# Patient Record
Sex: FEMALE | Race: WHITE | Employment: FULL TIME | URBAN - METROPOLITAN AREA
[De-identification: names, ages, dates, MRNs, and addresses within clinical notes are randomized per-mention and may not be internally consistent; named-entity substitution may affect disease eponyms.]

---

## 2023-02-18 PROBLEM — R05.9 COUGH: Status: ACTIVE | Noted: 2023-02-18

## 2023-02-18 PROBLEM — M19.071 PRIMARY OSTEOARTHRITIS OF BOTH FEET: Status: ACTIVE | Noted: 2023-02-18

## 2023-02-18 PROBLEM — R10.31 INGUINAL PAIN OF BOTH SIDES: Status: ACTIVE | Noted: 2023-02-18

## 2023-02-18 PROBLEM — R39.11 URINARY HESITANCY: Status: ACTIVE | Noted: 2023-02-18

## 2023-02-18 PROBLEM — R82.90 MALODOROUS URINE: Status: ACTIVE | Noted: 2023-02-18

## 2023-02-18 PROBLEM — H10.45 CONJUNCTIVITIS, ALLERGIC, CHRONIC: Status: ACTIVE | Noted: 2023-02-18

## 2023-02-18 PROBLEM — I10 BENIGN ESSENTIAL HYPERTENSION: Status: ACTIVE | Noted: 2023-02-18

## 2023-02-18 PROBLEM — M62.89 PELVIC FLOOR DYSFUNCTION: Status: ACTIVE | Noted: 2023-02-18

## 2023-02-18 PROBLEM — M19.072 PRIMARY OSTEOARTHRITIS OF BOTH FEET: Status: ACTIVE | Noted: 2023-02-18

## 2023-02-18 PROBLEM — M62.59 ATROPHY OF MUSCLE OF MULTIPLE SITES: Status: ACTIVE | Noted: 2023-02-18

## 2023-02-18 PROBLEM — M25.511 CHRONIC PERISCAPULAR PAIN ON RIGHT SIDE: Status: ACTIVE | Noted: 2023-02-18

## 2023-02-18 PROBLEM — R10.2 PELVIC PAIN: Status: ACTIVE | Noted: 2023-02-18

## 2023-02-18 PROBLEM — R59.0 LYMPHADENOPATHY, INGUINAL: Status: ACTIVE | Noted: 2023-02-18

## 2023-02-18 PROBLEM — R10.32 INGUINAL PAIN OF BOTH SIDES: Status: ACTIVE | Noted: 2023-02-18

## 2023-02-18 PROBLEM — M79.18 MYOFASCIAL PAIN: Status: ACTIVE | Noted: 2023-02-18

## 2023-02-18 PROBLEM — F64.9 GENDER DYSPHORIA: Status: ACTIVE | Noted: 2023-02-18

## 2023-02-18 PROBLEM — F41.9 ACUTE ANXIETY: Status: ACTIVE | Noted: 2023-02-18

## 2023-02-18 PROBLEM — M77.8 HAND TENDONITIS: Status: ACTIVE | Noted: 2023-02-18

## 2023-02-18 PROBLEM — G89.29 CHRONIC PERISCAPULAR PAIN ON RIGHT SIDE: Status: ACTIVE | Noted: 2023-02-18

## 2023-02-18 PROBLEM — K58.9 IRRITABLE BOWEL SYNDROME (IBS): Status: ACTIVE | Noted: 2023-02-18

## 2023-02-18 PROBLEM — M20.22 HALLUX RIGIDUS OF LEFT FOOT: Status: ACTIVE | Noted: 2023-02-18

## 2023-02-18 PROBLEM — J30.9 ALLERGIC RHINITIS: Status: ACTIVE | Noted: 2023-02-18

## 2023-02-18 PROBLEM — M20.21 HALLUX RIGIDUS OF RIGHT FOOT: Status: ACTIVE | Noted: 2023-02-18

## 2023-02-18 PROBLEM — M47.812 CERVICAL SPONDYLOSIS WITHOUT MYELOPATHY: Status: ACTIVE | Noted: 2023-02-18

## 2023-02-18 RX ORDER — CALCIUM CARBONATE 300MG(750)
1 TABLET,CHEWABLE ORAL
COMMUNITY

## 2023-02-18 RX ORDER — LIDOCAINE AND PRILOCAINE 25; 25 MG/G; MG/G
CREAM TOPICAL AS NEEDED
COMMUNITY
End: 2023-08-15 | Stop reason: ALTCHOICE

## 2023-02-18 RX ORDER — GABAPENTIN 100 MG/1
1 CAPSULE ORAL 2 TIMES DAILY
COMMUNITY
End: 2023-08-15 | Stop reason: ALTCHOICE

## 2023-02-18 RX ORDER — TIMOLOL MALEATE 5 MG/ML
1 SOLUTION/ DROPS OPHTHALMIC NIGHTLY
COMMUNITY
Start: 2021-05-24

## 2023-02-18 RX ORDER — ASCORBIC ACID 300 MG
1 TABLET,CHEWABLE ORAL
COMMUNITY
End: 2023-03-07 | Stop reason: ALTCHOICE

## 2023-02-18 RX ORDER — IBUPROFEN 100 MG/5ML
1 SUSPENSION, ORAL (FINAL DOSE FORM) ORAL DAILY
COMMUNITY

## 2023-02-18 RX ORDER — LOTEPREDNOL ETABONATE 5 MG/ML
1 SUSPENSION/ DROPS OPHTHALMIC
COMMUNITY
End: 2023-08-15 | Stop reason: ALTCHOICE

## 2023-02-18 RX ORDER — ESTRADIOL 0.1 MG/G
2 CREAM VAGINAL 3 TIMES WEEKLY
COMMUNITY

## 2023-02-18 RX ORDER — CYCLOBENZAPRINE HCL 10 MG
1 TABLET ORAL 3 TIMES DAILY PRN
COMMUNITY
Start: 2021-09-23

## 2023-02-18 RX ORDER — ALPRAZOLAM 0.25 MG/1
1 TABLET ORAL 3 TIMES DAILY PRN
COMMUNITY
Start: 2022-08-29 | End: 2023-08-15 | Stop reason: SDUPTHER

## 2023-02-18 RX ORDER — METHOCARBAMOL 750 MG/1
1 TABLET, FILM COATED ORAL 3 TIMES DAILY
COMMUNITY
Start: 2022-08-16 | End: 2023-08-15 | Stop reason: ALTCHOICE

## 2023-02-18 RX ORDER — MELOXICAM 15 MG/1
1 TABLET ORAL DAILY PRN
COMMUNITY
Start: 2022-07-06 | End: 2023-03-16

## 2023-02-18 RX ORDER — CYANOCOBALAMIN (VITAMIN B-12) 500 MCG
1 TABLET ORAL DAILY PRN
COMMUNITY
End: 2024-05-31 | Stop reason: ALTCHOICE

## 2023-02-18 RX ORDER — CALCIUM CARBONATE 500(1250)
1 TABLET ORAL DAILY
COMMUNITY
End: 2023-03-07 | Stop reason: ALTCHOICE

## 2023-02-18 RX ORDER — POLYETHYLENE GLYCOL 3350 17 G/17G
POWDER, FOR SOLUTION ORAL
COMMUNITY
Start: 2022-09-20 | End: 2023-08-15 | Stop reason: ALTCHOICE

## 2023-02-18 RX ORDER — MULTIVITAMIN
1 TABLET ORAL DAILY
COMMUNITY

## 2023-02-18 RX ORDER — SPIRONOLACTONE 25 MG
1 TABLET ORAL DAILY
COMMUNITY
End: 2024-05-31 | Stop reason: ALTCHOICE

## 2023-02-18 RX ORDER — ALBUTEROL SULFATE 90 UG/1
1-2 AEROSOL, METERED RESPIRATORY (INHALATION)
COMMUNITY
Start: 2022-10-07 | End: 2023-07-06 | Stop reason: SDUPTHER

## 2023-02-18 RX ORDER — S-ADENOSYLMETHIONINE SUL TOSYL 400 MG
TABLET, DELAYED RELEASE (ENTERIC COATED) ORAL
COMMUNITY

## 2023-02-18 RX ORDER — LISINOPRIL 20 MG/1
1 TABLET ORAL DAILY
COMMUNITY
End: 2023-08-15 | Stop reason: SDUPTHER

## 2023-02-18 RX ORDER — ESTRADIOL 0.1 MG/D
2 FILM, EXTENDED RELEASE TRANSDERMAL 2 TIMES WEEKLY
COMMUNITY
End: 2023-10-11 | Stop reason: SDUPTHER

## 2023-02-18 RX ORDER — PROGESTERONE 100 MG/1
100 CAPSULE ORAL NIGHTLY
COMMUNITY
End: 2023-12-21 | Stop reason: SDUPTHER

## 2023-03-07 ENCOUNTER — OFFICE VISIT (OUTPATIENT)
Dept: PRIMARY CARE | Facility: CLINIC | Age: 63
End: 2023-03-07
Payer: COMMERCIAL

## 2023-03-07 VITALS
WEIGHT: 152 LBS | BODY MASS INDEX: 25.33 KG/M2 | DIASTOLIC BLOOD PRESSURE: 73 MMHG | HEIGHT: 65 IN | HEART RATE: 71 BPM | SYSTOLIC BLOOD PRESSURE: 131 MMHG

## 2023-03-07 DIAGNOSIS — M54.12 CERVICAL RADICULOPATHY: Primary | ICD-10-CM

## 2023-03-07 PROCEDURE — 3078F DIAST BP <80 MM HG: CPT | Performed by: INTERNAL MEDICINE

## 2023-03-07 PROCEDURE — 1036F TOBACCO NON-USER: CPT | Performed by: INTERNAL MEDICINE

## 2023-03-07 PROCEDURE — 3075F SYST BP GE 130 - 139MM HG: CPT | Performed by: INTERNAL MEDICINE

## 2023-03-07 PROCEDURE — 99214 OFFICE O/P EST MOD 30 MIN: CPT | Performed by: INTERNAL MEDICINE

## 2023-03-07 RX ORDER — OXYCODONE AND ACETAMINOPHEN 10; 325 MG/1; MG/1
1 TABLET ORAL EVERY 6 HOURS PRN
Qty: 20 TABLET | Refills: 0 | Status: SHIPPED | OUTPATIENT
Start: 2023-03-07 | End: 2023-03-12

## 2023-03-07 NOTE — PROGRESS NOTES
"Subjective   Saurabh Patel is a 63 y.o. female who presents for medication .  [unfilled]  She is going to [place]  She recently had a relapse of arm pain due to her cspine issues.  She goes to chiropractor regularly and  traction at home  Is also taking mobic and tylenol.    She had been given rx for gabapentin but did not take it due to concern over side effects  Has seen  pain mgmt,was offered injections  She has a medrol dosepak that she has not used yet. It is current.  Cyclobenzaprine is her \"go to \" med. Usually takes at night.  Notes xanax works better for pain. Usually takes half of a tab.     She would like an rx for percocet . Says that the 5 mg dose is ineffective.     Review of Systems    Objective   /73   Pulse 71   Ht 1.651 m (5' 5\")   Wt 68.9 kg (152 lb)   BMI 25.29 kg/m²    Physical Exam  Constitutional:       Appearance: Normal appearance.   Cardiovascular:      Rate and Rhythm: Normal rate and regular rhythm.      Heart sounds: Normal heart sounds.   Pulmonary:      Effort: Pulmonary effort is normal.      Breath sounds: Normal breath sounds.   Musculoskeletal:      Cervical back: Normal range of motion.         Assessment/Plan   Cervical radiculopathy: sent RX for five days (#20) percocet 10/325. She said she had enough cyclobenzaprine . Is ok to take mobic as well . She will also take her medrol dosepak with her.   Anxiety: she acknowledges that the xanax helps with her with the anxiety that she experiences when she has pain. She says she usually will just take half a tab of xanax when she uses it. If she needs rx for xanax prior to leaving she will call.   She wanted me to enter orders for her for when she schedules her annual physical.    Problem List Items Addressed This Visit    None         "

## 2023-06-08 ENCOUNTER — TELEPHONE (OUTPATIENT)
Dept: PRIMARY CARE | Facility: CLINIC | Age: 63
End: 2023-06-08

## 2023-06-08 NOTE — TELEPHONE ENCOUNTER
Julius has annual in July. Asking for lab orders, bone density and mammogram orders. Can this be done,needs before ins. Runs out.

## 2023-06-12 DIAGNOSIS — Z12.31 VISIT FOR SCREENING MAMMOGRAM: Primary | ICD-10-CM

## 2023-06-12 DIAGNOSIS — Z13.820 ENCOUNTER FOR SCREENING FOR OSTEOPOROSIS: Primary | ICD-10-CM

## 2023-06-12 DIAGNOSIS — Z51.81 MEDICATION MONITORING ENCOUNTER: ICD-10-CM

## 2023-06-12 DIAGNOSIS — Z51.81 THERAPEUTIC DRUG MONITORING: ICD-10-CM

## 2023-06-12 DIAGNOSIS — Z13.6 SCREENING FOR CARDIOVASCULAR CONDITION: ICD-10-CM

## 2023-06-12 DIAGNOSIS — Z00.00 HEALTHCARE MAINTENANCE: ICD-10-CM

## 2023-06-26 ENCOUNTER — HOSPITAL ENCOUNTER (OUTPATIENT)
Dept: DATA CONVERSION | Facility: HOSPITAL | Age: 63
End: 2023-06-26
Attending: OBSTETRICS & GYNECOLOGY | Admitting: OBSTETRICS & GYNECOLOGY
Payer: COMMERCIAL

## 2023-06-26 DIAGNOSIS — K40.20 BILATERAL INGUINAL HERNIA, WITHOUT OBSTRUCTION OR GANGRENE, NOT SPECIFIED AS RECURRENT: ICD-10-CM

## 2023-06-26 DIAGNOSIS — F64.9 GENDER IDENTITY DISORDER, UNSPECIFIED: ICD-10-CM

## 2023-06-26 DIAGNOSIS — L92.9 GRANULOMATOUS DISORDER OF THE SKIN AND SUBCUTANEOUS TISSUE, UNSPECIFIED: ICD-10-CM

## 2023-06-26 DIAGNOSIS — F41.9 ANXIETY DISORDER, UNSPECIFIED: ICD-10-CM

## 2023-06-26 DIAGNOSIS — M47.812 SPONDYLOSIS WITHOUT MYELOPATHY OR RADICULOPATHY, CERVICAL REGION: ICD-10-CM

## 2023-06-26 DIAGNOSIS — F32.A DEPRESSION, UNSPECIFIED: ICD-10-CM

## 2023-06-26 DIAGNOSIS — N94.9 UNSPECIFIED CONDITION ASSOCIATED WITH FEMALE GENITAL ORGANS AND MENSTRUAL CYCLE: ICD-10-CM

## 2023-06-26 DIAGNOSIS — Z87.891 PERSONAL HISTORY OF NICOTINE DEPENDENCE: ICD-10-CM

## 2023-06-26 DIAGNOSIS — R52 PAIN, UNSPECIFIED: ICD-10-CM

## 2023-06-26 DIAGNOSIS — H54.7 UNSPECIFIED VISUAL LOSS: ICD-10-CM

## 2023-06-26 DIAGNOSIS — I10 ESSENTIAL (PRIMARY) HYPERTENSION: ICD-10-CM

## 2023-06-26 LAB
ANION GAP IN SER/PLAS: 13 MMOL/L (ref 10–20)
CALCIUM (MG/DL) IN SER/PLAS: 9 MG/DL (ref 8.6–10.3)
CARBON DIOXIDE, TOTAL (MMOL/L) IN SER/PLAS: 23 MMOL/L (ref 21–32)
CHLORIDE (MMOL/L) IN SER/PLAS: 105 MMOL/L (ref 98–107)
CREATININE (MG/DL) IN SER/PLAS: 0.95 MG/DL (ref 0.5–1.05)
GFR FEMALE: 68 ML/MIN/1.73M2
GLUCOSE (MG/DL) IN SER/PLAS: 105 MG/DL (ref 74–99)
HEMATOCRIT (%) IN BLOOD BY AUTOMATED COUNT: 40.1 % (ref 36–46)
HEMOGLOBIN (G/DL) IN BLOOD: 13.1 G/DL (ref 12–16)
POTASSIUM (MMOL/L) IN SER/PLAS: 4.4 MMOL/L (ref 3.5–5.3)
SODIUM (MMOL/L) IN SER/PLAS: 137 MMOL/L (ref 136–145)
UREA NITROGEN (MG/DL) IN SER/PLAS: 13 MG/DL (ref 6–23)

## 2023-06-28 LAB
ATRIAL RATE: 59 BPM
P AXIS: 34 DEGREES
P OFFSET: 202 MS
P ONSET: 146 MS
PR INTERVAL: 134 MS
Q ONSET: 213 MS
QRS COUNT: 9 BEATS
QRS DURATION: 88 MS
QT INTERVAL: 442 MS
QTC CALCULATION(BAZETT): 437 MS
QTC FREDERICIA: 439 MS
R AXIS: 16 DEGREES
T AXIS: 15 DEGREES
T OFFSET: 434 MS
VENTRICULAR RATE: 59 BPM

## 2023-07-02 ENCOUNTER — APPOINTMENT (OUTPATIENT)
Dept: PRIMARY CARE | Facility: CLINIC | Age: 63
End: 2023-07-02

## 2023-07-06 ENCOUNTER — TELEPHONE (OUTPATIENT)
Dept: PRIMARY CARE | Facility: CLINIC | Age: 63
End: 2023-07-06

## 2023-07-06 DIAGNOSIS — N39.0 ACUTE UTI: Primary | ICD-10-CM

## 2023-07-06 DIAGNOSIS — J45.20 MILD INTERMITTENT EXTRINSIC ASTHMA WITHOUT COMPLICATION (HHS-HCC): Primary | ICD-10-CM

## 2023-07-06 RX ORDER — ALBUTEROL SULFATE 90 UG/1
1-2 AEROSOL, METERED RESPIRATORY (INHALATION)
Qty: 18 G | Refills: 0 | Status: CANCELLED | OUTPATIENT
Start: 2023-07-06

## 2023-07-06 RX ORDER — ALBUTEROL SULFATE 90 UG/1
AEROSOL, METERED RESPIRATORY (INHALATION)
Qty: 18 G | Refills: 1 | Status: SHIPPED | OUTPATIENT
Start: 2023-07-06

## 2023-07-06 RX ORDER — CIPROFLOXACIN 500 MG/1
500 TABLET ORAL 2 TIMES DAILY
Qty: 10 TABLET | Refills: 0 | Status: SHIPPED | OUTPATIENT
Start: 2023-07-06 | End: 2023-07-11

## 2023-07-06 RX ORDER — PROGESTERONE 100 MG/1
100 CAPSULE ORAL DAILY
Qty: 30 CAPSULE | Refills: 0 | Status: CANCELLED | OUTPATIENT
Start: 2023-07-06

## 2023-07-06 NOTE — TELEPHONE ENCOUNTER
Patient believes she as a uti. Has had about two days. Some burning with urination, frequency, bladder aching, smells off. Was just at building having a bone density. Asking if doctor would call in something. [facility]    [phone]

## 2023-07-06 NOTE — TELEPHONE ENCOUNTER
"I called the pt she is also c/o urgency, foul smelling urine she noticed this am and tender and cramping in the \"pubic/abdominal area\"No fevers and no noticeable blood. Pt is concerned going in the weekend and asked if something can be called in for her.  "

## 2023-07-06 NOTE — TELEPHONE ENCOUNTER
I called the pt and notified her she is also requesting refill for albuterol inhaler and asked if you could order her progesterone for her.

## 2023-07-07 DIAGNOSIS — G89.29 OTHER CHRONIC PAIN: ICD-10-CM

## 2023-07-07 DIAGNOSIS — M25.511 PAIN IN RIGHT SHOULDER: ICD-10-CM

## 2023-07-11 RX ORDER — MELOXICAM 15 MG/1
TABLET ORAL
Qty: 30 TABLET | Refills: 2 | Status: SHIPPED | OUTPATIENT
Start: 2023-07-11 | End: 2023-08-15 | Stop reason: ALTCHOICE

## 2023-07-20 LAB
COMPLETE PATHOLOGY REPORT: NORMAL
CONVERTED CLINICAL DIAGNOSIS-HISTORY: NORMAL
CONVERTED FINAL DIAGNOSIS: NORMAL
CONVERTED FINAL REPORT PDF LINK TO COPY AND PASTE: NORMAL
CONVERTED GROSS DESCRIPTION: NORMAL

## 2023-07-28 ENCOUNTER — APPOINTMENT (OUTPATIENT)
Dept: PRIMARY CARE | Facility: CLINIC | Age: 63
End: 2023-07-28

## 2023-08-04 ENCOUNTER — APPOINTMENT (OUTPATIENT)
Dept: PRIMARY CARE | Facility: CLINIC | Age: 63
End: 2023-08-04
Payer: COMMERCIAL

## 2023-08-04 ENCOUNTER — LAB (OUTPATIENT)
Dept: LAB | Facility: LAB | Age: 63
End: 2023-08-04
Payer: COMMERCIAL

## 2023-08-04 DIAGNOSIS — Z00.00 HEALTHCARE MAINTENANCE: ICD-10-CM

## 2023-08-04 DIAGNOSIS — Z13.6 SCREENING FOR CARDIOVASCULAR CONDITION: ICD-10-CM

## 2023-08-04 DIAGNOSIS — Z51.81 MEDICATION MONITORING ENCOUNTER: ICD-10-CM

## 2023-08-04 LAB
BASOPHILS (10*3/UL) IN BLOOD BY AUTOMATED COUNT: 0.08 X10E9/L (ref 0–0.1)
BASOPHILS/100 LEUKOCYTES IN BLOOD BY AUTOMATED COUNT: 0.9 % (ref 0–2)
EOSINOPHILS (10*3/UL) IN BLOOD BY AUTOMATED COUNT: 0.2 X10E9/L (ref 0–0.7)
EOSINOPHILS/100 LEUKOCYTES IN BLOOD BY AUTOMATED COUNT: 2.4 % (ref 0–6)
ERYTHROCYTE DISTRIBUTION WIDTH (RATIO) BY AUTOMATED COUNT: 12.6 % (ref 11.5–14.5)
ERYTHROCYTE MEAN CORPUSCULAR HEMOGLOBIN CONCENTRATION (G/DL) BY AUTOMATED: 32.4 G/DL (ref 32–36)
ERYTHROCYTE MEAN CORPUSCULAR VOLUME (FL) BY AUTOMATED COUNT: 93 FL (ref 80–100)
ERYTHROCYTES (10*6/UL) IN BLOOD BY AUTOMATED COUNT: 4.31 X10E12/L (ref 4–5.2)
HEMATOCRIT (%) IN BLOOD BY AUTOMATED COUNT: 40.1 % (ref 36–46)
HEMOGLOBIN (G/DL) IN BLOOD: 13 G/DL (ref 12–16)
IMMATURE GRANULOCYTES/100 LEUKOCYTES IN BLOOD BY AUTOMATED COUNT: 0.4 % (ref 0–0.9)
LEUKOCYTES (10*3/UL) IN BLOOD BY AUTOMATED COUNT: 8.5 X10E9/L (ref 4.4–11.3)
LYMPHOCYTES (10*3/UL) IN BLOOD BY AUTOMATED COUNT: 1.54 X10E9/L (ref 1.2–4.8)
LYMPHOCYTES/100 LEUKOCYTES IN BLOOD BY AUTOMATED COUNT: 18.2 % (ref 13–44)
MONOCYTES (10*3/UL) IN BLOOD BY AUTOMATED COUNT: 0.68 X10E9/L (ref 0.1–1)
MONOCYTES/100 LEUKOCYTES IN BLOOD BY AUTOMATED COUNT: 8 % (ref 2–10)
NEUTROPHILS (10*3/UL) IN BLOOD BY AUTOMATED COUNT: 5.92 X10E9/L (ref 1.2–7.7)
NEUTROPHILS/100 LEUKOCYTES IN BLOOD BY AUTOMATED COUNT: 70.1 % (ref 40–80)
NRBC (PER 100 WBCS) BY AUTOMATED COUNT: 0 /100 WBC (ref 0–0)
PLATELETS (10*3/UL) IN BLOOD AUTOMATED COUNT: 363 X10E9/L (ref 150–450)

## 2023-08-04 PROCEDURE — 84443 ASSAY THYROID STIM HORMONE: CPT

## 2023-08-04 PROCEDURE — 85025 COMPLETE CBC W/AUTO DIFF WBC: CPT

## 2023-08-04 PROCEDURE — 80061 LIPID PANEL: CPT

## 2023-08-04 PROCEDURE — 36415 COLL VENOUS BLD VENIPUNCTURE: CPT

## 2023-08-04 PROCEDURE — 80053 COMPREHEN METABOLIC PANEL: CPT

## 2023-08-05 LAB
ALANINE AMINOTRANSFERASE (SGPT) (U/L) IN SER/PLAS: 13 U/L (ref 7–45)
ALBUMIN (G/DL) IN SER/PLAS: 4.4 G/DL (ref 3.4–5)
ALKALINE PHOSPHATASE (U/L) IN SER/PLAS: 46 U/L (ref 33–136)
ANION GAP IN SER/PLAS: 13 MMOL/L (ref 10–20)
ASPARTATE AMINOTRANSFERASE (SGOT) (U/L) IN SER/PLAS: 16 U/L (ref 9–39)
BILIRUBIN TOTAL (MG/DL) IN SER/PLAS: 0.6 MG/DL (ref 0–1.2)
CALCIUM (MG/DL) IN SER/PLAS: 9.2 MG/DL (ref 8.6–10.6)
CARBON DIOXIDE, TOTAL (MMOL/L) IN SER/PLAS: 27 MMOL/L (ref 21–32)
CHLORIDE (MMOL/L) IN SER/PLAS: 103 MMOL/L (ref 98–107)
CHOLESTEROL (MG/DL) IN SER/PLAS: 168 MG/DL (ref 0–199)
CHOLESTEROL IN HDL (MG/DL) IN SER/PLAS: 76.8 MG/DL
CHOLESTEROL/HDL RATIO: 2.2
CREATININE (MG/DL) IN SER/PLAS: 1.08 MG/DL (ref 0.5–1.05)
ESTRADIOL (PG/ML) IN SER/PLAS: 127 PG/ML
GFR FEMALE: 58 ML/MIN/1.73M2
GLUCOSE (MG/DL) IN SER/PLAS: 92 MG/DL (ref 74–99)
LDL: 74 MG/DL (ref 0–99)
POTASSIUM (MMOL/L) IN SER/PLAS: 4.8 MMOL/L (ref 3.5–5.3)
PROTEIN TOTAL: 7.4 G/DL (ref 6.4–8.2)
SODIUM (MMOL/L) IN SER/PLAS: 138 MMOL/L (ref 136–145)
TESTOSTERONE (NG/DL) IN SER/PLAS: <60 NG/DL (ref 0–70)
THYROTROPIN (MIU/L) IN SER/PLAS BY DETECTION LIMIT <= 0.05 MIU/L: 0.81 MIU/L (ref 0.44–3.98)
THYROTROPIN (MIU/L) IN SER/PLAS BY DETECTION LIMIT <= 0.05 MIU/L: 0.83 MIU/L (ref 0.44–3.98)
TRIGLYCERIDE (MG/DL) IN SER/PLAS: 84 MG/DL (ref 0–149)
UREA NITROGEN (MG/DL) IN SER/PLAS: 21 MG/DL (ref 6–23)
VLDL: 17 MG/DL (ref 0–40)

## 2023-08-14 ASSESSMENT — PROMIS GLOBAL HEALTH SCALE
RATE_PHYSICAL_HEALTH: GOOD
RATE_SOCIAL_SATISFACTION: FAIR
RATE_AVERAGE_PAIN: 3
CARRYOUT_SOCIAL_ACTIVITIES: FAIR
CARRYOUT_PHYSICAL_ACTIVITIES: COMPLETELY
RATE_MENTAL_HEALTH: GOOD
RATE_GENERAL_HEALTH: VERY GOOD
RATE_AVERAGE_FATIGUE: MILD
EMOTIONAL_PROBLEMS: SOMETIMES
RATE_QUALITY_OF_LIFE: GOOD

## 2023-08-15 ENCOUNTER — OFFICE VISIT (OUTPATIENT)
Dept: PRIMARY CARE | Facility: CLINIC | Age: 63
End: 2023-08-15
Payer: COMMERCIAL

## 2023-08-15 VITALS
HEIGHT: 66 IN | SYSTOLIC BLOOD PRESSURE: 124 MMHG | WEIGHT: 146 LBS | RESPIRATION RATE: 18 BRPM | DIASTOLIC BLOOD PRESSURE: 61 MMHG | HEART RATE: 62 BPM | BODY MASS INDEX: 23.46 KG/M2

## 2023-08-15 DIAGNOSIS — M47.812 CERVICAL SPONDYLOSIS WITHOUT MYELOPATHY: ICD-10-CM

## 2023-08-15 DIAGNOSIS — Z00.00 ENCOUNTER FOR PREVENTATIVE ADULT HEALTH CARE EXAMINATION: ICD-10-CM

## 2023-08-15 DIAGNOSIS — I10 BENIGN ESSENTIAL HTN: ICD-10-CM

## 2023-08-15 DIAGNOSIS — F41.9 ANXIETY: Primary | ICD-10-CM

## 2023-08-15 DIAGNOSIS — K21.9 GASTROESOPHAGEAL REFLUX DISEASE WITHOUT ESOPHAGITIS: ICD-10-CM

## 2023-08-15 PROBLEM — M79.18 MYOFASCIAL PAIN: Status: RESOLVED | Noted: 2023-02-18 | Resolved: 2023-08-15

## 2023-08-15 PROBLEM — R05.9 COUGH: Status: RESOLVED | Noted: 2023-02-18 | Resolved: 2023-08-15

## 2023-08-15 PROBLEM — H10.45 CONJUNCTIVITIS, ALLERGIC, CHRONIC: Status: RESOLVED | Noted: 2023-02-18 | Resolved: 2023-08-15

## 2023-08-15 PROBLEM — R10.2 PELVIC PAIN: Status: RESOLVED | Noted: 2023-02-18 | Resolved: 2023-08-15

## 2023-08-15 PROBLEM — M62.89 PELVIC FLOOR DYSFUNCTION: Status: RESOLVED | Noted: 2023-02-18 | Resolved: 2023-08-15

## 2023-08-15 PROBLEM — R10.31 INGUINAL PAIN OF BOTH SIDES: Status: RESOLVED | Noted: 2023-02-18 | Resolved: 2023-08-15

## 2023-08-15 PROBLEM — M62.59 ATROPHY OF MUSCLE OF MULTIPLE SITES: Status: RESOLVED | Noted: 2023-02-18 | Resolved: 2023-08-15

## 2023-08-15 PROBLEM — R59.0 LYMPHADENOPATHY, INGUINAL: Status: RESOLVED | Noted: 2023-02-18 | Resolved: 2023-08-15

## 2023-08-15 PROBLEM — G89.29 CHRONIC PERISCAPULAR PAIN ON RIGHT SIDE: Status: RESOLVED | Noted: 2023-02-18 | Resolved: 2023-08-15

## 2023-08-15 PROBLEM — M77.8 HAND TENDONITIS: Status: RESOLVED | Noted: 2023-02-18 | Resolved: 2023-08-15

## 2023-08-15 PROBLEM — M25.511 CHRONIC PERISCAPULAR PAIN ON RIGHT SIDE: Status: RESOLVED | Noted: 2023-02-18 | Resolved: 2023-08-15

## 2023-08-15 PROBLEM — R82.90 MALODOROUS URINE: Status: RESOLVED | Noted: 2023-02-18 | Resolved: 2023-08-15

## 2023-08-15 PROBLEM — R39.11 URINARY HESITANCY: Status: RESOLVED | Noted: 2023-02-18 | Resolved: 2023-08-15

## 2023-08-15 PROBLEM — R10.32 INGUINAL PAIN OF BOTH SIDES: Status: RESOLVED | Noted: 2023-02-18 | Resolved: 2023-08-15

## 2023-08-15 PROCEDURE — 3078F DIAST BP <80 MM HG: CPT | Performed by: INTERNAL MEDICINE

## 2023-08-15 PROCEDURE — 1036F TOBACCO NON-USER: CPT | Performed by: INTERNAL MEDICINE

## 2023-08-15 PROCEDURE — 3074F SYST BP LT 130 MM HG: CPT | Performed by: INTERNAL MEDICINE

## 2023-08-15 PROCEDURE — 99396 PREV VISIT EST AGE 40-64: CPT | Performed by: INTERNAL MEDICINE

## 2023-08-15 RX ORDER — PANTOPRAZOLE SODIUM 40 MG/1
40 TABLET, DELAYED RELEASE ORAL
Qty: 90 TABLET | Refills: 3 | Status: SHIPPED | OUTPATIENT
Start: 2023-08-15

## 2023-08-15 RX ORDER — ALPRAZOLAM 0.25 MG/1
0.25 TABLET ORAL 3 TIMES DAILY PRN
Qty: 30 TABLET | Refills: 0 | Status: SHIPPED | OUTPATIENT
Start: 2023-08-15

## 2023-08-15 RX ORDER — OXYCODONE AND ACETAMINOPHEN 10; 325 MG/1; MG/1
0.5 TABLET ORAL EVERY 6 HOURS PRN
Qty: 30 TABLET | Refills: 0 | Status: SHIPPED | OUTPATIENT
Start: 2023-08-15 | End: 2023-12-26 | Stop reason: SDUPTHER

## 2023-08-15 RX ORDER — IBUPROFEN 600 MG/1
600 TABLET ORAL EVERY 4 HOURS PRN
COMMUNITY
Start: 2023-06-26

## 2023-08-15 RX ORDER — VITAMIN B COMPLEX
CAPSULE ORAL
COMMUNITY
Start: 2023-06-02

## 2023-08-15 RX ORDER — MULTIVIT WITH CALCIUM,IRON,MIN 18MG-0.4MG
TABLET ORAL
COMMUNITY

## 2023-08-15 RX ORDER — ALPHA LIPOIC ACID 300 MG
CAPSULE ORAL
COMMUNITY
Start: 2023-06-02

## 2023-08-15 RX ORDER — LISINOPRIL 20 MG/1
20 TABLET ORAL DAILY
Qty: 90 TABLET | Refills: 3 | Status: SHIPPED | OUTPATIENT
Start: 2023-08-15

## 2023-08-15 RX ORDER — PANTOPRAZOLE SODIUM 40 MG/1
40 TABLET, DELAYED RELEASE ORAL DAILY PRN
COMMUNITY
End: 2023-08-15 | Stop reason: SDUPTHER

## 2023-08-15 ASSESSMENT — PAIN SCALES - GENERAL: PAINLEVEL: 0-NO PAIN

## 2023-08-15 NOTE — PROGRESS NOTES
Subjective   Saurabh Patel is a 63 y.o. female who presents for Follow-up and Annual Exam (Pt here for yearly exam. Denies any new problems or concerns. Pt had vaginal revision on June 26th with Dr Olivier. Pt denies any problems after that).    She had a revision of her vaginal canal as there was some stenosis after surgery  She also had an area of perineal infection that needed additonal surgical attention    Lost her job in August after 35 years  She had to work for a month after that to get severance pay  She is going to work at [employer] now    She was having terrible headaches (those have resolved)  She was seeing PT for her neck : her neck spasms are improving  She had been doing more exercise  She has had much improvement in her spasm  She is taking ibuprofen    She does take alprazolam for anxiety or for neck spasm  She says this usually works better than cyclobenzaprine  She will use it for a few nights and then does not need it    She did use percocet during her vacion; she had severe arm symptoms then    When this happens she also uses traction    Reviewed her labs: her total chol was 168, LDL was 74.  Creatinine was mildly elevated but not worrisome.     Review of Systems   Respiratory:  Negative for chest tightness and shortness of breath.    Cardiovascular:  Negative for chest pain.   Gastrointestinal:  Negative for constipation and diarrhea.   Genitourinary:  Negative for difficulty urinating.   OARRS:  Lauren Cedeño DO on 8/15/2023 12:34 PM  I have personally reviewed the OARRS report for Saurabh Patel. I have considered the risks of abuse, dependence, addiction and diversion and I believe that it is clinically appropriate for Saurabh Patel to be prescribed this medication    Is the patient prescribed a combination of a benzodiazepine and opioid?  Yes, I feel it is clincially indicated to continue the medication and have discussed with the patient  "risks/benefits/alternatives.    Last Urine Drug Screen / ordered today: No  No results found for this or any previous visit (from the past 8760 hour(s)).  N/A  Clinical rationale for not completing a Urine Drug Screen:  Pt unable to void today.  Also only uses on a intermittent basis.     Controlled Substance Agreement:  Date of the Last Agreement: 8/15/2023  Reviewed Controlled Substance Agreement including but not limited to the benefits, risks, and alternatives to treatment with a Controlled Substance medication(s).    Opioids:  What is the patient's goal of therapy? Treatment of periodic neck pain  Is this being achieved with current treatment? yes    I have calculated the patient's Morphine Dose Equivalent (MED):   I have considered referral to Pain Management and/or a specialist, and do not feel it is necessary at this time.    I feel that it is clinically indicated to continue this current medication regimen after consideration of alternative therapies, and other non-opioid treatment.    Opioid Risk Screening:  No data recorded    Pain Assessment:  No data recorded    Objective   /61 (BP Location: Right arm, Patient Position: Sitting, BP Cuff Size: Adult)   Pulse 62   Resp 18   Ht 1.664 m (5' 5.5\")   Wt 66.2 kg (146 lb)   BMI 23.93 kg/m²    Physical Exam  Eyes:      Extraocular Movements: Extraocular movements intact.      Conjunctiva/sclera: Conjunctivae normal.      Pupils: Pupils are equal, round, and reactive to light.   Cardiovascular:      Rate and Rhythm: Normal rate and regular rhythm.      Heart sounds: Normal heart sounds.   Pulmonary:      Effort: Pulmonary effort is normal.      Breath sounds: Normal breath sounds.   Abdominal:      General: Abdomen is flat. Bowel sounds are normal.      Palpations: Abdomen is soft.         Assessment/Plan   Problem List Items Addressed This Visit       Cervical spondylosis without myelopathy    Relevant Medications    oxyCODONE-acetaminophen (Percocet) "  mg tablet:L RX for #30 tabs . Take one half tab every 6 hours as needed.  Last refill was filled on 6/26/23 for  #12 tabs.      Other Visit Diagnoses       Anxiety    -  Primary     Relevant Medications    ALPRAZolam (Xanax) 0.25 mg tablet rx #30 tabs/NR  last RX for alprazolam was filled 12/19/2022      Benign essential HTN        Relevant Medications    lisinopril 20 mg tablet    Gastroesophageal reflux disease without esophagitis        Relevant Medications    pantoprazole (ProtoNix) 40 mg EC tablet    Encounter for preventative adult health care examination        See me again in 6 mo.

## 2023-08-29 ASSESSMENT — ENCOUNTER SYMPTOMS
CHEST TIGHTNESS: 0
SHORTNESS OF BREATH: 0
DIARRHEA: 0
CONSTIPATION: 0
DIFFICULTY URINATING: 0

## 2023-09-29 VITALS
RESPIRATION RATE: 18 BRPM | DIASTOLIC BLOOD PRESSURE: 71 MMHG | SYSTOLIC BLOOD PRESSURE: 159 MMHG | HEART RATE: 60 BPM | TEMPERATURE: 97 F

## 2023-09-30 NOTE — H&P
History of Present Illness:   History Present Illness:  Reason for surgery: Resection of granulation/fibrosis   HPI:    64 yo s/p PPT vaginoplasty, with some pain and discomfort in the labia majora and interior vagina    Discussed plan of resection of dog ears b/l and granuloma concomitantly with  vaginal dilation and excision of scar tissue possible with tissue substitute.      HTN  H/o orchiectomy, vaginoplasty    Allergies:        Allergies:  ·  nortriptyline : Other (Severe)       Intolerances:  ·  clindamycin : GI Upset    Home Medication Review:   Home Medications Reviewed: yes     Impression/Procedure:   ·  Impression and Planned Procedure: resection of dog ears b/l and granuloma concomitantly with  vaginal dilation and excision of scar tissue possible with tissue substitute.       ERAS (Enhanced Recovery After Surgery):  ·  ERAS Patient: no       Vital Signs:  Temperature C: 36.1 degrees C   Temperature F: 96.9 degrees F   Heart Rate: 60 beats per minute   Respiratory Rate: 18 breath per minute   Blood Pressure Systolic: 159 mm/Hg   Blood Pressure Diastolic: 71 mm/Hg     Physical Exam by System:    Constitutional: Well developed, awake/alert/oriented  x3, no distress, alert and cooperative   Eyes: PERRL, EOMI, clear sclera   Head/Neck: Neck supple, no apparent injury, thyroid  without mass or tenderness, No JVD, trachea midline, no bruits   Respiratory/Thorax: Patent airways, CTAB, normal  breath sounds with good chest expansion, thorax symmetric   Cardiovascular: Regular, rate and rhythm, no murmurs,  2+ equal pulses of the extremities, normal S 1and S 2   Genitourinary: defer to the OR   Psychological: Appropriate mood and behavior   Skin: Warm and dry, no lesions, no rashes     Consent:   COVID-19 Consent:  ·  COVID-19 Risk Consent Surgeon has reviewed key risks related to the risk of sameer COVID-19 and if they contract COVID-19 what the risks are.       Electronic Signatures:  Radha Olivier  (MD)  (Signed 26-Jun-2023 07:24)   Authored: History of Present Illness, Allergies, Home  Medication Review, Impression/Procedure, ERAS, Physical Exam, Consent, Note Completion      Last Updated: 26-Jun-2023 07:24 by Radha Olivier)

## 2023-10-02 NOTE — OP NOTE
Post Operative Note:     PreOp Diagnosis: Gender dysphoria, pain   Post-Procedure Diagnosis: Excessive tissue, granuloma   Procedure: 1. Resection of excess tissue from labia  majora bilaterally  2. Vaginal widening with tissue substitute 5 cm X 2 cm bilaterally, myriad  3. Resection of granuloma from perineum  4.   5.   Surgeon:    Resident/Fellow/Other Assistant: SASHA   Anesthesia: gen LMA   Estimated Blood Loss (mL): 20   Specimen: yes   Complications: none   Findings: dog ears anteriorly on both side of labia  majora, granuloma indurated within perineum, and granulation tissue, narrowing from contracted tissue vaginally     Operative Report Dictated:  Dictation: not applicable - note contains Operative  Report   Operative Report:    After anesthesia was found to be adequate, the pt was prepped and draped in dorsal lithotomy position. Anterior dog ears were marked out in elliptical fashion and  excised sharply. Tissue was approximated in 3 layers, with 3- 0 vicryl followd by 3-0 monocryl in subcuticular fashion on both sides.   Next, the perineum was inspected and granuloma/indurated tissue palpated. a small 5 mm incision was made on the perineum and this indurated material was grasped with an allis. digital rectum exam was done to make sure the tissue was not connected to the  rectum. This induration was slowly and methodically resected with the bovie and a purulent yellow fluid was expressed. Scalpel was used to debride additional material. Concern was this was closed to the external anal sphincter, therefore, another exam  was done and the necrotic tissue was safely debrided without compromising the sphincter. Dead space was copiously irrigated and closed off with 2-0 monocryl. Subcuticular closure was then done with 3-0 monocryl.    Attention was then turned to the vaginal where whispy granulation tissue was resected with the bovie. Depth was limited but tissue amenable to dilation. Bilaterally  relaxing incisions were made with the bovie, leaving a defect of 2 cm wide by 5 cm long.  This area was then covered with myriad tissue substitute on both sides and sutured into place with 2-0 monocryl. This allowed for more space.     Two layers of xerform were rolled together and sutured to create a mold. This was placed and the procedure was concluded. The pt tolerated the procedure and all counts were correct X 2. She was brought to the PACU in stable condition.       Electronic Signatures:  Radha Olivier)  (Signed 26-Jun-2023 09:46)   Authored: Post Operative Note, Note Completion      Last Updated: 26-Jun-2023 09:46 by Radha Olivier)

## 2023-10-10 ENCOUNTER — PATIENT MESSAGE (OUTPATIENT)
Dept: UROLOGY | Facility: CLINIC | Age: 63
End: 2023-10-10

## 2023-10-10 DIAGNOSIS — F64.9 GENDER DYSPHORIA: ICD-10-CM

## 2023-10-16 RX ORDER — ESTRADIOL 0.1 MG/D
2 FILM, EXTENDED RELEASE TRANSDERMAL 2 TIMES WEEKLY
Qty: 48 PATCH | Refills: 1 | Status: SHIPPED | OUTPATIENT
Start: 2023-10-16 | End: 2024-04-15

## 2023-10-26 ENCOUNTER — CLINICAL SUPPORT (OUTPATIENT)
Dept: PRIMARY CARE | Facility: CLINIC | Age: 63
End: 2023-10-26
Payer: COMMERCIAL

## 2023-10-26 DIAGNOSIS — Z23 NEED FOR INFLUENZA VACCINATION: ICD-10-CM

## 2023-10-26 PROCEDURE — 90471 IMMUNIZATION ADMIN: CPT | Performed by: INTERNAL MEDICINE

## 2023-10-26 PROCEDURE — 90686 IIV4 VACC NO PRSV 0.5 ML IM: CPT | Performed by: INTERNAL MEDICINE

## 2023-12-21 DIAGNOSIS — F64.9 GENDER DYSPHORIA: ICD-10-CM

## 2023-12-21 RX ORDER — PROGESTERONE 100 MG/1
100 CAPSULE ORAL NIGHTLY
Qty: 90 CAPSULE | Refills: 2 | Status: SHIPPED | OUTPATIENT
Start: 2023-12-21

## 2023-12-26 DIAGNOSIS — M47.812 CERVICAL SPONDYLOSIS WITHOUT MYELOPATHY: ICD-10-CM

## 2023-12-26 RX ORDER — OXYCODONE AND ACETAMINOPHEN 10; 325 MG/1; MG/1
0.5 TABLET ORAL EVERY 6 HOURS PRN
Qty: 30 TABLET | Refills: 0 | Status: SHIPPED | OUTPATIENT
Start: 2023-12-26 | End: 2024-05-16 | Stop reason: SDUPTHER

## 2023-12-26 NOTE — TELEPHONE ENCOUNTER
Pt requests refill for oxycodone/acetaminophen which she periodically uses for neck pain . She has a controlled substance agreement. I personally reviewed the OARRS report for this patient and found no concern for abuse, dependence or diversion. Refill sent per pt request.

## 2023-12-26 NOTE — TELEPHONE ENCOUNTER
Patient called for a refill on Oxy-Codone acetaminophen 10-32 0.5   [facility] [phone]  [facility] [phone]

## 2024-02-02 ENCOUNTER — OFFICE VISIT (OUTPATIENT)
Dept: UROLOGY | Facility: CLINIC | Age: 64
End: 2024-02-02
Payer: COMMERCIAL

## 2024-02-02 VITALS
HEART RATE: 67 BPM | BODY MASS INDEX: 24.66 KG/M2 | DIASTOLIC BLOOD PRESSURE: 74 MMHG | HEIGHT: 65 IN | SYSTOLIC BLOOD PRESSURE: 171 MMHG | TEMPERATURE: 97.9 F | WEIGHT: 148 LBS

## 2024-02-02 DIAGNOSIS — F64.9 GENDER DYSPHORIA: Primary | ICD-10-CM

## 2024-02-02 PROCEDURE — 99396 PREV VISIT EST AGE 40-64: CPT | Performed by: OBSTETRICS & GYNECOLOGY

## 2024-02-02 PROCEDURE — 1036F TOBACCO NON-USER: CPT | Performed by: OBSTETRICS & GYNECOLOGY

## 2024-02-02 NOTE — PROGRESS NOTES
FOLLOW-UP VISIT     PROBLEM LIST:  1. Gender dysphoria       HISTORY OF PRESENT ILLNESS:   Saurabh Patel is a 63 y.o. female who presents on 2/2/24 for a follow up visit. She is dilating intermittently and can go a day or two without but notes what she believes are keloids are present. She reports there is tenderness but attributes it to nerve issues. There was discharge while dilating but that has since resolved and reports no problems with orgasms.     She would like to lighten the darkened scar tissue.     PAST MEDICAL HISTORY:  No past medical history on file.    PAST SURGICAL HISTORY:  Past Surgical History:   Procedure Laterality Date    OTHER SURGICAL HISTORY  07/26/2022    Eye surgery        ALLERGIES:   Allergies   Allergen Reactions    Clindamycin Diarrhea    Nortriptyline Other        MEDICATIONS:   [unfilled]     SOCIAL HISTORY:  Patient  reports that she has never smoked. She has never been exposed to tobacco smoke. She has never used smokeless tobacco.   Social History     Socioeconomic History    Marital status: Single     Spouse name: Not on file    Number of children: Not on file    Years of education: Not on file    Highest education level: Not on file   Occupational History    Not on file   Tobacco Use    Smoking status: Never     Passive exposure: Never    Smokeless tobacco: Never   Substance and Sexual Activity    Alcohol use: Not on file    Drug use: Not on file    Sexual activity: Not on file   Other Topics Concern    Not on file   Social History Narrative    Not on file     Social Determinants of Health     Financial Resource Strain: Not on file   Food Insecurity: Not on file   Transportation Needs: Not on file   Physical Activity: Not on file   Stress: Not on file   Social Connections: Not on file   Intimate Partner Violence: Not on file   Housing Stability: Not on file       FAMILY HISTORY:  Family History   Problem Relation Name Age of Onset    Multiple myeloma Mother      Heart  "failure Father         REVIEW OF SYSTEMS:  Constitutional: Negative for fever and chills. Denies anorexia, weight loss.  Eyes: Negative for visual disturbance.   Respiratory: Negative for shortness of breath.    Cardiovascular: Negative for chest pain.   Gastrointestinal: Negative for nausea and vomiting.   Genitourinary: See interval history above.  Skin: Negative for rash.   Neurological: Negative for dizziness and numbness.   Psychiatric/Behavioral: Negative for confusion and decreased concentration.     PHYSICAL EXAM:  Blood pressure 171/74, pulse 67, temperature 36.6 °C (97.9 °F), height 1.651 m (5' 5\"), weight 67.1 kg (148 lb).  Constitutional: Patient appears well-developed and well-nourished. No distress.    Head: Normocephalic and atraumatic.    Neck: Normal range of motion.    Cardiovascular: Normal rate.    Pulmonary/Chest: Effort normal. No respiratory distress.   Abdominal:   : Healed well and no issues. Minor hyperpigmentation.   Musculoskeletal: Normal range of motion.    Neurological: Alert and oriented to person, place, and time.  Psychiatric: Normal mood and affect. Behavior is normal. Thought content normal.         Assessment:        Saurabh Patel is a 63 y.o. female with gender dysphoria.      Plan:    Discussed possible scar revision. Follow up in 6-12 months as needed for a well woman's visit.       By signing my name below, ISander Scribe   attest that this documentation has been prepared under the direction and in the presence of Dr. Radha Olivier.      "

## 2024-02-27 ENCOUNTER — TELEPHONE (OUTPATIENT)
Dept: PRIMARY CARE | Facility: CLINIC | Age: 64
End: 2024-02-27
Payer: COMMERCIAL

## 2024-02-27 NOTE — TELEPHONE ENCOUNTER
"Pt called and stated she is experiencing a \"neck spasm\" on the left side of her neck/throat for about a week. Pt's Physical Therapist believes pt did something to muscle in neck and tried to massage it and didn't help. Pt complains of a sore throat, sore to talk and eat. Would like to be seen today.   Pt# [phone]  "

## 2024-02-28 ENCOUNTER — APPOINTMENT (OUTPATIENT)
Dept: PRIMARY CARE | Facility: CLINIC | Age: 64
End: 2024-02-28
Payer: COMMERCIAL

## 2024-04-13 DIAGNOSIS — F64.9 GENDER DYSPHORIA: ICD-10-CM

## 2024-04-15 RX ORDER — ESTRADIOL 0.1 MG/D
2 FILM, EXTENDED RELEASE TRANSDERMAL 2 TIMES WEEKLY
Qty: 48 PATCH | Refills: 1 | Status: SHIPPED | OUTPATIENT
Start: 2024-04-15 | End: 2024-09-30

## 2024-05-13 ENCOUNTER — HOSPITAL ENCOUNTER (OUTPATIENT)
Dept: RADIOLOGY | Facility: EXTERNAL LOCATION | Age: 64
Discharge: HOME | End: 2024-05-13
Payer: COMMERCIAL

## 2024-05-13 ENCOUNTER — TELEPHONE (OUTPATIENT)
Dept: PRIMARY CARE | Facility: CLINIC | Age: 64
End: 2024-05-13
Payer: COMMERCIAL

## 2024-05-13 DIAGNOSIS — M47.812 CERVICAL SPONDYLOSIS WITHOUT MYELOPATHY: ICD-10-CM

## 2024-05-13 DIAGNOSIS — M79.642 LEFT HAND PAIN: ICD-10-CM

## 2024-05-13 RX ORDER — OXYCODONE AND ACETAMINOPHEN 10; 325 MG/1; MG/1
0.5 TABLET ORAL EVERY 6 HOURS PRN
Qty: 30 TABLET | Refills: 0 | Status: CANCELLED | OUTPATIENT
Start: 2024-05-13

## 2024-05-13 NOTE — TELEPHONE ENCOUNTER
Pt calling for refill on   Oxycodone  mg tablet  Take 0.5 tablets by mouth every 6 hours if needed for severe pain    [phone]

## 2024-05-16 ENCOUNTER — OFFICE VISIT (OUTPATIENT)
Dept: PRIMARY CARE | Facility: CLINIC | Age: 64
End: 2024-05-16
Payer: COMMERCIAL

## 2024-05-16 VITALS
HEIGHT: 65 IN | HEART RATE: 63 BPM | BODY MASS INDEX: 24.63 KG/M2 | SYSTOLIC BLOOD PRESSURE: 127 MMHG | RESPIRATION RATE: 16 BRPM | DIASTOLIC BLOOD PRESSURE: 75 MMHG

## 2024-05-16 DIAGNOSIS — Z00.00 GENERAL MEDICAL EXAM: ICD-10-CM

## 2024-05-16 DIAGNOSIS — E55.9 VITAMIN D DEFICIENCY: ICD-10-CM

## 2024-05-16 DIAGNOSIS — M47.812 CERVICAL SPONDYLOSIS WITHOUT MYELOPATHY: ICD-10-CM

## 2024-05-16 DIAGNOSIS — I10 BENIGN ESSENTIAL HTN: Primary | ICD-10-CM

## 2024-05-16 PROCEDURE — 3078F DIAST BP <80 MM HG: CPT | Performed by: INTERNAL MEDICINE

## 2024-05-16 PROCEDURE — 99214 OFFICE O/P EST MOD 30 MIN: CPT | Performed by: INTERNAL MEDICINE

## 2024-05-16 PROCEDURE — 3074F SYST BP LT 130 MM HG: CPT | Performed by: INTERNAL MEDICINE

## 2024-05-16 RX ORDER — OXYCODONE AND ACETAMINOPHEN 10; 325 MG/1; MG/1
0.5 TABLET ORAL EVERY 6 HOURS PRN
Qty: 60 TABLET | Refills: 0 | Status: SHIPPED | OUTPATIENT
Start: 2024-05-16

## 2024-05-16 ASSESSMENT — PAIN SCALES - GENERAL: PAINLEVEL: 0-NO PAIN

## 2024-05-16 NOTE — PATIENT INSTRUCTIONS
See me again for your Annual physcial.     Get blood test after fasting close to that visit.     I sent in for #60 oxycodone.

## 2024-05-16 NOTE — PROGRESS NOTES
Subjective   Saurabh Patel is a 64 y.o. female who presents for Follow-up (Follow up and review after the hand injury recently).    She needs to update her CSA and she did this today    She uses percocet only as needed, for neck pain  She filled this last on 12/26/23 for 30 tabs.   She usually takes one half tablet at a time       She follows up with chiropractic on a regular basis  She has issues with hallux rigidus and she is working on increasing motion     She was trying to mount a bell on posts in her yard  She feels she hyperextended her left middle finger; she had an xray at [facility] that was negative    OARRS:  Lauren Cedeño,  on 5/16/2024 10:24 AM  I have personally reviewed the OARRS report for Saurabh Patel. I have considered the risks of abuse, dependence, addiction and diversion and I believe that it is clinically appropriate for Saurabh Patel to be prescribed this medication    Is the patient prescribed a combination of a benzodiazepine and opioid?  No    Last Urine Drug Screen / ordered today: No  No results found for this or any previous visit (from the past 8760 hour(s)).  N/A    Will order at next visit.       Controlled Substance Agreement:  Date of the Last Agreement: 5/16/24  Reviewed Controlled Substance Agreement including but not limited to the benefits, risks, and alternatives to treatment with a Controlled Substance medication(s).    Opioids:  What is the patient's goal of therapy? Periodic treatment of c-spine pain  Is this being achieved with current treatment? yes    I have calculated the patient's Morphine Dose Equivalent (MED):   I have considered referral to Pain Management and/or a specialist, and do not feel it is necessary at this time.    I feel that it is clinically indicated to continue this current medication regimen after consideration of alternative therapies, and other non-opioid treatment.    Opioid Risk Screening:  No data recorded    Pain  "Assessment:  No data recorded  Review of Systems    Objective   /75   Pulse 63   Resp 16   Ht 1.651 m (5' 5\")   BMI 24.63 kg/m²    Physical Exam  Visit Vitals  /75   Pulse 63   Resp 16   Ht 1.651 m (5' 5\")   BMI 24.63 kg/m²   Smoking Status Never   BSA 1.75 m²      GEN: NAD  HEENT: normal  NECK: no adenopathy, no thyroid enlargment  LUNGS: CTAB  CV: reg S1/S2 no murmurs  EXT: no leg edema   Assessment/Plan   Problem List Items Addressed This Visit       Cervical spondylosis without myelopathy    Relevant Medications    oxyCODONE-acetaminophen (Percocet)  mg tablet rx sent for #60 tabs of oxycodone/acetaminophen 10/325. She says that this should last her almost a year.  Did complete CSA      Other Visit Diagnoses       Benign essential HTN    -  Primary    Relevant Orders    Comprehensive Metabolic Panel    Lipid Panel    TSH with reflex to Free T4 if abnormal    CBC    Vitamin D deficiency        Relevant Orders    Vitamin D 25-Hydroxy,Total (for eval of Vitamin D levels)                   "

## 2024-05-17 ENCOUNTER — TELEPHONE (OUTPATIENT)
Dept: PRIMARY CARE | Facility: CLINIC | Age: 64
End: 2024-05-17
Payer: COMMERCIAL

## 2024-05-17 NOTE — TELEPHONE ENCOUNTER
Patient asking in regards to her left hand- wondering if she should get an mri of hand. Thought she might get done on Saturday,but told her insurance does not work that fast. Might be better off letting specialist she is seeing Tuesday order. Still wants me to check with you and get your opinion on this.  [phone]

## 2024-05-21 ENCOUNTER — OFFICE VISIT (OUTPATIENT)
Dept: ORTHOPEDIC SURGERY | Facility: CLINIC | Age: 64
End: 2024-05-21
Payer: COMMERCIAL

## 2024-05-21 DIAGNOSIS — S63.639A SPRAIN OF PROXIMAL INTERPHALANGEAL (PIP) JOINT OF FINGER: Primary | ICD-10-CM

## 2024-05-21 DIAGNOSIS — M79.642 LEFT HAND PAIN: ICD-10-CM

## 2024-05-21 PROCEDURE — 99203 OFFICE O/P NEW LOW 30 MIN: CPT | Performed by: STUDENT IN AN ORGANIZED HEALTH CARE EDUCATION/TRAINING PROGRAM

## 2024-05-21 PROCEDURE — 1036F TOBACCO NON-USER: CPT | Performed by: STUDENT IN AN ORGANIZED HEALTH CARE EDUCATION/TRAINING PROGRAM

## 2024-05-21 NOTE — PROGRESS NOTES
History of Present Illness:  Presents for evaluation of left long finger.  The patient sustained an acute injury and notes pain and swelling.    The pain is sharp in nature, severe, worse with movement and better with rest.    Approximately 16 days ago sustained initial injury while trying to catch a cast iron bell    Review of Systems   GENERAL: Negative for malaise, significant weight loss, fever  MUSCULOSKELETAL: see HPI  NEURO:  Negative    The patient's past medical history, family history, social history, and review of systems were reviewed. History is otherwise negative except as stated in the HPI.    Physical Examination:  General: Alert and oriented to person, place, and time.  No acute distress and breathing comfortably: Pleasant and cooperative with examination.  HEENT: Head is normocephalic and atraumatic.  Neck: Supple, no visible swelling.  Cardiovascular: No palpable tachycardia  Lungs: No audible wheezing or labored breathing  Abdomen: Nondistended.  On musculoskeletal examination, the patient has full elbow range of motion. In regards to the hand, there is swelling with some deformity. There mild swelling of the e long finger but the skin is intact. Range of motion and strength are limited secondary to pain. There is tenderness to palpation of the PIP volarly. There is no obvious tenderness to palpation about the scaphoid or the SL interval, or distal radius. Sensation and motor function are intact in the radial, ulnar, and median nerve distribution. The patient has intact flexor and extensor function, but has difficulty making a full fist secondary to pain. The hand itself is warm and well perfused. The contralateral hand and wrist are normal to inspection, range of motion, stability, and strength.      Imaging:  AP, lateral, and oblique radiographs of the left long finger demonstrate v soft tissue swelling without acute osseous process  Assessment:  Patient with a left long volar plate  sprain    Plan:  I had long discussion with the patient regarding this injury. As it involves the PIP joint immobilization causes significant stiffness.   I would like them to work on range of motion for this finger.  Discussed that this can be a nagging injury and that swelling is common up to 6 months.  I do anticipate they will have considerable pain improvement over the course of the next week.      Follow up: 1 month DARIUSZ Jeffrey MD

## 2024-05-31 ENCOUNTER — APPOINTMENT (OUTPATIENT)
Dept: ORTHOPEDIC SURGERY | Facility: CLINIC | Age: 64
End: 2024-05-31
Payer: COMMERCIAL

## 2024-09-06 ENCOUNTER — TELEPHONE (OUTPATIENT)
Dept: UROLOGY | Facility: CLINIC | Age: 64
End: 2024-09-06

## 2024-09-06 ENCOUNTER — APPOINTMENT (OUTPATIENT)
Dept: ORTHOPEDIC SURGERY | Facility: CLINIC | Age: 64
End: 2024-09-06
Payer: COMMERCIAL

## 2024-09-06 DIAGNOSIS — I10 BENIGN ESSENTIAL HTN: Primary | ICD-10-CM

## 2024-09-06 DIAGNOSIS — M25.532 WRIST PAIN, ACUTE, LEFT: Primary | ICD-10-CM

## 2024-09-06 DIAGNOSIS — Z51.81 MEDICATION MONITORING ENCOUNTER: ICD-10-CM

## 2024-09-06 DIAGNOSIS — F64.9 GENDER DYSPHORIA: ICD-10-CM

## 2024-09-06 DIAGNOSIS — Z12.5 SCREENING FOR PROSTATE CANCER: ICD-10-CM

## 2024-09-06 NOTE — PROGRESS NOTES
HPI:  Presents to discuss left wrist concerns.  Complains of ulnar sided wrist pain.  Symptoms have been present for months.   Treatment to date has included bracing.  The symptoms are improved by rest and exacerbated by activity, particularly pronation supination motion.     Previously seen 3 months ago for left long volar plate sprain.  Feels this is 80 to 90% better.  Does still notice it particularly with more high intensity activities      The patient's past medical history, family history, social history, and review of systems were reviewed. History is otherwise negative except as stated in the HPI.    Review of Systems   GENERAL: Negative for malaise, significant weight loss, fever  MUSCULOSKELETAL: see HPI  NEURO:  Negative    Physical Examination:   General: Alert and oriented to person, place, and time.  No acute distress and breathing comfortably: Pleasant and cooperative with examination.  HEENT: Head is normocephalic and atraumatic.  Neck: Supple, no visible swelling.  Cardiovascular: No palpable tachycardia  Lungs: No audible wheezing or labored breathing  Abdomen: Nondistended. Bilateral upper limbs have equal and intact peripheral pulses. Skin does not demonstrate any rashes or lesions. Shoulders and elbows have pain free range of motion.  Negative carpal tunnel compression testing and Phalen testing on the left.  Intact sensation to light touch in the radial 3 digits.  Positive tenderness to palpation fovea and TFCC, DRUJ stable to stress.  No TTP ECU tendon/stable.   Normal wrist ROM, no radial sided TTP, no TTP LT/pisiform/hook of hamate.    Assessment: Left wrist TFCC pain    Plan: Discussed at length.  This comes and goes and is worse with activities.  She has a brace that she utilizes when painful.  Discussed activity modification and anti-inflammatories as needed.  Discussed cortisone injections if this continues.  X-ray left hand at follow-up if persistent complaints    Follow up as  needed    Kika Jeffrey MD

## 2024-09-16 ENCOUNTER — TELEPHONE (OUTPATIENT)
Dept: PRIMARY CARE | Facility: CLINIC | Age: 64
End: 2024-09-16
Payer: COMMERCIAL

## 2024-09-17 DIAGNOSIS — F64.9 GENDER DYSPHORIA: ICD-10-CM

## 2024-09-18 ENCOUNTER — LAB (OUTPATIENT)
Dept: LAB | Facility: LAB | Age: 64
End: 2024-09-18
Payer: COMMERCIAL

## 2024-09-18 DIAGNOSIS — I10 BENIGN ESSENTIAL HTN: ICD-10-CM

## 2024-09-18 DIAGNOSIS — Z51.81 MEDICATION MONITORING ENCOUNTER: ICD-10-CM

## 2024-09-18 DIAGNOSIS — F64.9 GENDER DYSPHORIA: ICD-10-CM

## 2024-09-18 DIAGNOSIS — Z12.5 SCREENING FOR PROSTATE CANCER: ICD-10-CM

## 2024-09-18 DIAGNOSIS — E55.9 VITAMIN D DEFICIENCY: ICD-10-CM

## 2024-09-18 LAB
25(OH)D3 SERPL-MCNC: 45 NG/ML (ref 30–100)
ALBUMIN SERPL BCP-MCNC: 4.4 G/DL (ref 3.4–5)
ALP SERPL-CCNC: 54 U/L (ref 33–136)
ALT SERPL W P-5'-P-CCNC: 15 U/L (ref 7–45)
ANION GAP SERPL CALC-SCNC: 14 MMOL/L (ref 10–20)
AST SERPL W P-5'-P-CCNC: 17 U/L (ref 9–39)
BILIRUB SERPL-MCNC: 0.7 MG/DL (ref 0–1.2)
BUN SERPL-MCNC: 15 MG/DL (ref 6–23)
CALCIUM SERPL-MCNC: 9.5 MG/DL (ref 8.6–10.6)
CHLORIDE SERPL-SCNC: 102 MMOL/L (ref 98–107)
CHOLEST SERPL-MCNC: 217 MG/DL (ref 0–199)
CHOLESTEROL/HDL RATIO: 3.1
CO2 SERPL-SCNC: 28 MMOL/L (ref 21–32)
CREAT SERPL-MCNC: 1.04 MG/DL (ref 0.5–1.05)
EGFRCR SERPLBLD CKD-EPI 2021: 61 ML/MIN/1.73M*2
ERYTHROCYTE [DISTWIDTH] IN BLOOD BY AUTOMATED COUNT: 11.9 % (ref 11.5–14.5)
ESTRADIOL SERPL-MCNC: 106 PG/ML
GLUCOSE SERPL-MCNC: 104 MG/DL (ref 74–99)
HCT VFR BLD AUTO: 40.1 % (ref 36–46)
HDLC SERPL-MCNC: 70.8 MG/DL
HGB BLD-MCNC: 13.3 G/DL (ref 12–16)
LDLC SERPL CALC-MCNC: 121 MG/DL
MCH RBC QN AUTO: 30 PG (ref 26–34)
MCHC RBC AUTO-ENTMCNC: 33.2 G/DL (ref 32–36)
MCV RBC AUTO: 91 FL (ref 80–100)
NON HDL CHOLESTEROL: 146 MG/DL (ref 0–149)
NRBC BLD-RTO: 0 /100 WBCS (ref 0–0)
PLATELET # BLD AUTO: 348 X10*3/UL (ref 150–450)
POTASSIUM SERPL-SCNC: 4.9 MMOL/L (ref 3.5–5.3)
PROT SERPL-MCNC: 7.2 G/DL (ref 6.4–8.2)
PSA SERPL-MCNC: <0.1 NG/ML
RBC # BLD AUTO: 4.43 X10*6/UL (ref 4–5.2)
SODIUM SERPL-SCNC: 139 MMOL/L (ref 136–145)
TESTOST SERPL-MCNC: <30 NG/DL (ref 0–70)
TRIGL SERPL-MCNC: 124 MG/DL (ref 0–149)
TSH SERPL-ACNC: 0.98 MIU/L (ref 0.44–3.98)
VLDL: 25 MG/DL (ref 0–40)
WBC # BLD AUTO: 6.5 X10*3/UL (ref 4.4–11.3)

## 2024-09-18 PROCEDURE — 36415 COLL VENOUS BLD VENIPUNCTURE: CPT

## 2024-09-18 RX ORDER — PROGESTERONE 100 MG/1
100 CAPSULE ORAL NIGHTLY
Qty: 90 CAPSULE | Refills: 1 | Status: SHIPPED | OUTPATIENT
Start: 2024-09-18

## 2024-09-19 ENCOUNTER — HOSPITAL ENCOUNTER (OUTPATIENT)
Dept: RADIOLOGY | Facility: CLINIC | Age: 64
Discharge: HOME | End: 2024-09-19
Payer: COMMERCIAL

## 2024-09-19 VITALS — WEIGHT: 145 LBS | BODY MASS INDEX: 24.16 KG/M2 | HEIGHT: 65 IN

## 2024-09-19 DIAGNOSIS — Z12.31 SCREENING MAMMOGRAM FOR BREAST CANCER: ICD-10-CM

## 2024-09-19 PROCEDURE — 77067 SCR MAMMO BI INCL CAD: CPT | Performed by: RADIOLOGY

## 2024-09-19 PROCEDURE — 77067 SCR MAMMO BI INCL CAD: CPT

## 2024-09-19 PROCEDURE — 77063 BREAST TOMOSYNTHESIS BI: CPT | Performed by: RADIOLOGY

## 2024-09-24 ENCOUNTER — PREP FOR PROCEDURE (OUTPATIENT)
Dept: UROLOGY | Facility: CLINIC | Age: 64
End: 2024-09-24
Payer: COMMERCIAL

## 2024-09-24 DIAGNOSIS — F64.9 GENDER DYSPHORIA: ICD-10-CM

## 2024-09-24 DIAGNOSIS — F52.0 HYPOACTIVE SEXUAL DESIRE DISORDER: ICD-10-CM

## 2024-09-24 DIAGNOSIS — R79.89 LOW TESTOSTERONE: ICD-10-CM

## 2024-09-24 RX ORDER — ESTRADIOL 0.1 MG/D
2 FILM, EXTENDED RELEASE TRANSDERMAL 2 TIMES WEEKLY
Qty: 48 PATCH | Refills: 0 | Status: SHIPPED | OUTPATIENT
Start: 2024-09-26 | End: 2025-03-13

## 2024-09-24 RX ORDER — TESTOSTERONE 20.25 MG/1.25G
GEL TOPICAL
Qty: 75 G | Refills: 3 | Status: SHIPPED | OUTPATIENT
Start: 2024-09-24

## 2024-09-24 NOTE — RESULT ENCOUNTER NOTE
Oh sorry, we must have talked about it but not started it. I would offer her weekly testosterone if she's having low desire. Otherwise, we don't have to keep checking testosterone. Thanks!

## 2024-10-04 ENCOUNTER — TELEPHONE (OUTPATIENT)
Dept: PRIMARY CARE | Facility: CLINIC | Age: 64
End: 2024-10-04
Payer: COMMERCIAL

## 2024-10-04 NOTE — TELEPHONE ENCOUNTER
Patient calling with a suspected urinary infection.   This has been going on for about a week.   She feels as if she has to urinate but then only a few drops come out. The area feels sore.     She doesn't want this to continue through the weekend.    [facility] [phone]    [facility] [place]

## 2024-10-08 DIAGNOSIS — I10 BENIGN ESSENTIAL HTN: ICD-10-CM

## 2024-10-08 DIAGNOSIS — K21.9 GASTROESOPHAGEAL REFLUX DISEASE WITHOUT ESOPHAGITIS: ICD-10-CM

## 2024-10-08 RX ORDER — PANTOPRAZOLE SODIUM 40 MG/1
40 TABLET, DELAYED RELEASE ORAL
Qty: 90 TABLET | Refills: 3 | Status: SHIPPED | OUTPATIENT
Start: 2024-10-08

## 2024-10-08 RX ORDER — LISINOPRIL 20 MG/1
20 TABLET ORAL DAILY
Qty: 90 TABLET | Refills: 3 | Status: SHIPPED | OUTPATIENT
Start: 2024-10-08

## 2024-10-20 ENCOUNTER — OFFICE VISIT (OUTPATIENT)
Dept: URGENT CARE | Age: 64
End: 2024-10-20
Payer: COMMERCIAL

## 2024-10-20 VITALS
TEMPERATURE: 96.9 F | BODY MASS INDEX: 24.16 KG/M2 | OXYGEN SATURATION: 99 % | RESPIRATION RATE: 15 BRPM | DIASTOLIC BLOOD PRESSURE: 77 MMHG | HEIGHT: 65 IN | SYSTOLIC BLOOD PRESSURE: 136 MMHG | WEIGHT: 145 LBS | HEART RATE: 50 BPM

## 2024-10-20 DIAGNOSIS — N30.01 ACUTE CYSTITIS WITH HEMATURIA: Primary | ICD-10-CM

## 2024-10-20 DIAGNOSIS — R39.89 URINARY PROBLEM: ICD-10-CM

## 2024-10-20 LAB
POC APPEARANCE, URINE: CLEAR
POC BILIRUBIN, URINE: NEGATIVE
POC BLOOD, URINE: ABNORMAL
POC COLOR, URINE: YELLOW
POC GLUCOSE, URINE: NEGATIVE MG/DL
POC KETONES, URINE: NEGATIVE MG/DL
POC LEUKOCYTES, URINE: ABNORMAL
POC NITRITE,URINE: NEGATIVE
POC PH, URINE: 6 PH
POC PROTEIN, URINE: NEGATIVE MG/DL
POC SPECIFIC GRAVITY, URINE: 1.01
POC UROBILINOGEN, URINE: 0.2 EU/DL

## 2024-10-20 PROCEDURE — 87186 SC STD MICRODIL/AGAR DIL: CPT

## 2024-10-20 PROCEDURE — 87086 URINE CULTURE/COLONY COUNT: CPT

## 2024-10-20 RX ORDER — PHENAZOPYRIDINE HYDROCHLORIDE 100 MG/1
TABLET, FILM COATED ORAL
Qty: 9 TABLET | Refills: 0 | Status: SHIPPED | OUTPATIENT
Start: 2024-10-20

## 2024-10-20 RX ORDER — NITROFURANTOIN 25; 75 MG/1; MG/1
100 CAPSULE ORAL 2 TIMES DAILY
Qty: 14 CAPSULE | Refills: 0 | Status: SHIPPED | OUTPATIENT
Start: 2024-10-20 | End: 2024-10-27

## 2024-10-20 ASSESSMENT — ENCOUNTER SYMPTOMS
MUSCULOSKELETAL NEGATIVE: 1
CARDIOVASCULAR NEGATIVE: 1
RESPIRATORY NEGATIVE: 1
GASTROINTESTINAL NEGATIVE: 1
HEMATOLOGIC/LYMPHATIC NEGATIVE: 1
CONSTITUTIONAL NEGATIVE: 1
ALLERGIC/IMMUNOLOGIC NEGATIVE: 1
EYES NEGATIVE: 1
PSYCHIATRIC NEGATIVE: 1
ENDOCRINE NEGATIVE: 1
NEUROLOGICAL NEGATIVE: 1

## 2024-10-20 NOTE — PROGRESS NOTES
"Subjective   Patient ID: Saurabh Patel is a 64 y.o. female. They present today with a chief complaint of Urinary Problem (Little bit of burning and feeling like she is not emptying her bladder).    History of Present Illness  Patient is a 63 y/o female presenting with urinary urgency, frequency, burning x1 week. Denies hematuria, flank pain, pelvic pressure, fever, chills, bodyaches, lethargy, weakness, chest pain/tightness/pressure, SOB, wheezing, N/V/D, ABD pain. No OTC medication reported to be taken.           Past Medical History  Allergies as of 10/20/2024 - Reviewed 10/20/2024   Allergen Reaction Noted    Clindamycin Diarrhea 02/18/2023    Nortriptyline Other 03/07/2023       (Not in a hospital admission)       Past Medical History:   Diagnosis Date    Anxiety     Arthritis        Past Surgical History:   Procedure Laterality Date    EYE SURGERY  Cataract    OTHER SURGICAL HISTORY  07/26/2022    Eye surgery        reports that she has never smoked. She has never been exposed to tobacco smoke. She has never used smokeless tobacco.    Review of Systems  Review of Systems   Constitutional: Negative.    HENT: Negative.     Eyes: Negative.    Respiratory: Negative.     Cardiovascular: Negative.    Gastrointestinal: Negative.    Endocrine: Negative.    Genitourinary:         Urinary urgency, frequency, burning   Musculoskeletal: Negative.    Skin: Negative.    Allergic/Immunologic: Negative.    Neurological: Negative.    Hematological: Negative.    Psychiatric/Behavioral: Negative.                                    Objective    Vitals:    10/20/24 0834   BP: 136/77   Pulse: 50   Resp: 15   Temp: 36.1 °C (96.9 °F)   SpO2: 99%   Weight: 65.8 kg (145 lb)   Height: 1.651 m (5' 5\")     No LMP recorded. Patient is postmenopausal.    Physical Exam  Constitutional:       Comments: Patient A/O x4, LOC 5, calm and cooperative. Patient self-ambulatory to treatment area and is in no acute distress.    HENT:      Head: " Normocephalic and atraumatic.      Nose: Nose normal.   Eyes:      Extraocular Movements: Extraocular movements intact.      Conjunctiva/sclera: Conjunctivae normal.      Pupils: Pupils are equal, round, and reactive to light.   Cardiovascular:      Rate and Rhythm: Normal rate and regular rhythm.      Pulses: Normal pulses.      Heart sounds: Normal heart sounds.   Pulmonary:      Effort: Pulmonary effort is normal.      Breath sounds: Normal breath sounds.   Abdominal:      General: Abdomen is flat. Bowel sounds are normal.      Palpations: Abdomen is soft.   Genitourinary:     Comments: Negative CV tenderness to percussion bilaterally. Urinalysis with 1+ leucocytes, 3+ hematuria. Remainder unremarkable  Musculoskeletal:         General: Normal range of motion.      Cervical back: Neck supple.   Skin:     General: Skin is warm and dry.      Capillary Refill: Capillary refill takes less than 2 seconds.   Neurological:      General: No focal deficit present.      Mental Status: She is oriented to person, place, and time.   Psychiatric:         Mood and Affect: Mood normal.         Behavior: Behavior normal.         Procedures    Point of Care Test & Imaging Results from this visit  Results for orders placed or performed in visit on 10/20/24   POCT UA Automated manually resulted   Result Value Ref Range    POC Color, Urine Yellow Straw, Yellow, Light-Yellow    POC Appearance, Urine Clear Clear    POC Glucose, Urine NEGATIVE NEGATIVE mg/dl    POC Bilirubin, Urine NEGATIVE NEGATIVE    POC Ketones, Urine NEGATIVE NEGATIVE mg/dl    POC Specific Gravity, Urine 1.010 1.005 - 1.035    POC Blood, Urine MODERATE (2+) (A) NEGATIVE    POC PH, Urine 6.0 No Reference Range Established PH    POC Protein, Urine NEGATIVE NEGATIVE, 30 (1+) mg/dl    POC Urobilinogen, Urine 0.2 0.2, 1.0 EU/DL    Poc Nitrite, Urine NEGATIVE NEGATIVE    POC Leukocytes, Urine SMALL (1+) (A) NEGATIVE      No results found.    Diagnostic study results (if  any) were reviewed by HARDEEP Perkins.    Assessment/Plan   Allergies, medications, history, and pertinent labs/EKGs/Imaging reviewed by HARDEEP Perkins.     Medical Decision Making  Patient presenting with one week of urinary urgency, frequency, burning. Will treat with macrobid and send for culture & sensitivity. At time of discharge, patient was clinically well-appearing and appropriate for outpatient management. The patient/parent/guardian was educated regarding diagnosis, supportive care, OTC and Rx medications. The patient/parent/guardian was given the opportunity to ask questions prior to discharge. They verbalized understanding of discussion of treatment plan, expected course of illness and/or injury, indications on when to return to , when to seek further evaluation in ED/call 911, and the need to follow up with PCP and/or specialist as referred. Patient/parent/guardian was provided with work/school documentation if requested. Patient stable upon discharge.     Orders and Diagnoses  Diagnoses and all orders for this visit:  Acute cystitis with hematuria  -     nitrofurantoin, macrocrystal-monohydrate, (Macrobid) 100 mg capsule; Take 1 capsule (100 mg) by mouth 2 times a day for 7 days. Take with food  -     phenazopyridine (Pyridium) 100 mg tablet; Take 1 tablet (100mg) by mouth three times daily after meals as needed for urinary burning. Will turn urine orange/red in color  Urinary problem  -     POCT UA Automated manually resulted  -     Urine Culture      Medical Admin Record      Patient disposition: Home    Electronically signed by HARDEEP Perkins  8:54 AM

## 2024-10-23 ENCOUNTER — TELEPHONE (OUTPATIENT)
Dept: URGENT CARE | Age: 64
End: 2024-10-23

## 2024-10-23 ENCOUNTER — TELEPHONE (OUTPATIENT)
Dept: PRIMARY CARE | Facility: CLINIC | Age: 64
End: 2024-10-23
Payer: COMMERCIAL

## 2024-10-23 LAB — BACTERIA UR CULT: ABNORMAL

## 2024-10-23 RX ORDER — SULFAMETHOXAZOLE AND TRIMETHOPRIM 800; 160 MG/1; MG/1
1 TABLET ORAL 2 TIMES DAILY
Qty: 10 TABLET | Refills: 0 | Status: SHIPPED | OUTPATIENT
Start: 2024-10-23 | End: 2024-10-28

## 2024-10-23 NOTE — TELEPHONE ENCOUNTER
Pt informed of UTI & macrobid resistance. Will start Bactrim & follow up if symptoms continue beyond course of AB.

## 2024-10-27 ASSESSMENT — PROMIS GLOBAL HEALTH SCALE
RATE_GENERAL_HEALTH: VERY GOOD
RATE_AVERAGE_PAIN: 3
CARRYOUT_SOCIAL_ACTIVITIES: VERY GOOD
CARRYOUT_PHYSICAL_ACTIVITIES: MOSTLY
EMOTIONAL_PROBLEMS: SOMETIMES
RATE_PHYSICAL_HEALTH: GOOD
RATE_QUALITY_OF_LIFE: GOOD
RATE_SOCIAL_SATISFACTION: GOOD
RATE_MENTAL_HEALTH: GOOD
RATE_AVERAGE_FATIGUE: MODERATE

## 2024-10-31 ENCOUNTER — APPOINTMENT (OUTPATIENT)
Dept: PRIMARY CARE | Facility: CLINIC | Age: 64
End: 2024-10-31
Payer: COMMERCIAL

## 2024-10-31 VITALS
SYSTOLIC BLOOD PRESSURE: 136 MMHG | HEART RATE: 61 BPM | DIASTOLIC BLOOD PRESSURE: 86 MMHG | RESPIRATION RATE: 16 BRPM | HEIGHT: 65 IN | BODY MASS INDEX: 25.12 KG/M2 | WEIGHT: 150.8 LBS

## 2024-10-31 DIAGNOSIS — Z23 FLU VACCINE NEED: ICD-10-CM

## 2024-10-31 DIAGNOSIS — M47.812 CERVICAL SPONDYLOSIS WITHOUT MYELOPATHY: ICD-10-CM

## 2024-10-31 DIAGNOSIS — E78.5 ELEVATED LIPIDS: ICD-10-CM

## 2024-10-31 DIAGNOSIS — R30.0 DYSURIA: Primary | ICD-10-CM

## 2024-10-31 DIAGNOSIS — Z00.00 ENCOUNTER FOR PREVENTIVE HEALTH EXAMINATION: ICD-10-CM

## 2024-10-31 DIAGNOSIS — Z51.81 MEDICATION MONITORING ENCOUNTER: ICD-10-CM

## 2024-10-31 DIAGNOSIS — R73.09 ELEVATED GLUCOSE: ICD-10-CM

## 2024-10-31 LAB
POC APPEARANCE, URINE: CLEAR
POC BILIRUBIN, URINE: NEGATIVE
POC BLOOD, URINE: NEGATIVE
POC COLOR, URINE: YELLOW
POC GLUCOSE, URINE: NEGATIVE MG/DL
POC KETONES, URINE: NEGATIVE MG/DL
POC LEUKOCYTES, URINE: NEGATIVE
POC NITRITE,URINE: NEGATIVE
POC PH, URINE: 5.5 PH
POC PROTEIN, URINE: NEGATIVE MG/DL
POC SPECIFIC GRAVITY, URINE: 1.01
POC UROBILINOGEN, URINE: 0.2 EU/DL

## 2024-10-31 PROCEDURE — 81003 URINALYSIS AUTO W/O SCOPE: CPT | Performed by: INTERNAL MEDICINE

## 2024-10-31 PROCEDURE — 90656 IIV3 VACC NO PRSV 0.5 ML IM: CPT | Performed by: INTERNAL MEDICINE

## 2024-10-31 PROCEDURE — 80346 BENZODIAZEPINES1-12: CPT

## 2024-10-31 PROCEDURE — 80358 DRUG SCREENING METHADONE: CPT

## 2024-10-31 PROCEDURE — 90471 IMMUNIZATION ADMIN: CPT | Performed by: INTERNAL MEDICINE

## 2024-10-31 PROCEDURE — 99396 PREV VISIT EST AGE 40-64: CPT | Performed by: INTERNAL MEDICINE

## 2024-10-31 PROCEDURE — 80307 DRUG TEST PRSMV CHEM ANLYZR: CPT

## 2024-10-31 PROCEDURE — 80368 SEDATIVE HYPNOTICS: CPT

## 2024-10-31 PROCEDURE — 3008F BODY MASS INDEX DOCD: CPT | Performed by: INTERNAL MEDICINE

## 2024-10-31 PROCEDURE — 80373 DRUG SCREENING TRAMADOL: CPT

## 2024-10-31 PROCEDURE — 80349 CANNABINOIDS NATURAL: CPT

## 2024-10-31 PROCEDURE — 82570 ASSAY OF URINE CREATININE: CPT

## 2024-10-31 PROCEDURE — 80361 OPIATES 1 OR MORE: CPT

## 2024-10-31 PROCEDURE — 80365 DRUG SCREENING OXYCODONE: CPT

## 2024-10-31 PROCEDURE — 80354 DRUG SCREENING FENTANYL: CPT

## 2024-10-31 RX ORDER — OXYCODONE AND ACETAMINOPHEN 10; 325 MG/1; MG/1
0.5 TABLET ORAL EVERY 6 HOURS PRN
Qty: 60 TABLET | Refills: 0 | Status: SHIPPED | OUTPATIENT
Start: 2024-10-31

## 2024-10-31 ASSESSMENT — ENCOUNTER SYMPTOMS
CONSTIPATION: 0
DIARRHEA: 0
SHORTNESS OF BREATH: 0

## 2024-10-31 ASSESSMENT — PAIN SCALES - GENERAL: PAINLEVEL_OUTOF10: 0-NO PAIN

## 2024-10-31 ASSESSMENT — PATIENT HEALTH QUESTIONNAIRE - PHQ9
SUM OF ALL RESPONSES TO PHQ9 QUESTIONS 1 AND 2: 0
2. FEELING DOWN, DEPRESSED OR HOPELESS: NOT AT ALL
1. LITTLE INTEREST OR PLEASURE IN DOING THINGS: NOT AT ALL

## 2024-10-31 NOTE — PATIENT INSTRUCTIONS
If you find that you have more frequent UTI , can use Uqora which you can buy online, to help prevent uti's.     If anyting abnormal with your UA test, I will let you know.    I recommend that you take a B complex ( [place] Super B) and also vitamin D3 2000 daily.     Get blood test for cholesterol again in 6 months .    See me again in one year for annual physical (45 min)

## 2024-10-31 NOTE — PROGRESS NOTES
"Subjective   Patient ID: Saurabh Patel is a 64 y.o. female who presents for annual physical.     HPI     Here for annual physical    Remains on lisinopril 20 mg     Had labs Sept: total cholesterol 217 , up some from 168 and , up from 74  HDL 70.8  Glucose was 104    Had a UTI: went to urgent care  Was told there was a \"blazing UTI\"  Had proteus: was resistant to nitrofurantoin so they prescribed Bactrim   She does not have any of her previous symptoms  Had burning and pain in the entire pubic mons and in her low back    Was started on testosterone gel recently     Last colonoscopy was 2015    OARRS:  Lauren Cedeño, DO on 10/31/2024 10:00 AM  I have personally reviewed the OARRS report for Saurabh Patel. I have considered the risks of abuse, dependence, addiction and diversion and I believe that it is clinically appropriate for Saurabh Patel to be prescribed this medication    Is the patient prescribed a combination of a benzodiazepine and opioid?  Yes, I feel it is clincially indicated to continue the medication and have discussed with the patient risks/benefits/alternatives.    Last Urine Drug Screen / ordered today: Yes  No results found for this or any previous visit (from the past 8760 hours).  N/A      Controlled Substance Agreement:  Date of the Last Agreement: 10/31/2024  Reviewed Controlled Substance Agreement including but not limited to the benefits, risks, and alternatives to treatment with a Controlled Substance medication(s).    Opioids:  What is the patient's goal of therapy? Treatment of neck pain.    Is this being achieved with current treatment? Yes. Pt does not take every day, just uses one half tab as needed.  Last fill was #60 tabs 05/16/2024    I have calculated the patient's Morphine Dose Equivalent (MED):   I have considered referral to Pain Management and/or a specialist, and do not feel it is necessary at this time.    I feel that it is clinically indicated " to continue this current medication regimen after consideration of alternative therapies, and other non-opioid treatment.    Opioid Risk Screening:  No data recorded    Pain Assessment:  No data recorded    Review of Systems   Respiratory:  Negative for shortness of breath.    Cardiovascular:  Negative for chest pain.   Gastrointestinal:  Negative for constipation and diarrhea.        Metamucil 5 days a week          Current Outpatient Medications:     albuterol 90 mcg/actuation inhaler, One or two puffs every 4 hours as needed for wheezing or shortness of breath., Disp: 18 g, Rfl: 1    alpha lipoic acid 300 mg capsule, Take by mouth., Disp: , Rfl:     ALPRAZolam (Xanax) 0.25 mg tablet, Take 1 tablet (0.25 mg) by mouth 3 times a day as needed for anxiety., Disp: 30 tablet, Rfl: 0    ascorbic acid (Vitamin C) 1,000 mg tablet, Take 1 tablet (1,000 mg) by mouth once daily., Disp: , Rfl:     b complex vitamins capsule, Take by mouth., Disp: , Rfl:     cyclobenzaprine (Flexeril) 10 mg tablet, Take 1 tablet (10 mg) by mouth 3 times a day as needed., Disp: , Rfl:     estradiol (Estrace) 0.01 % (0.1 mg/gram) vaginal cream, Insert 0.5 Applicatorfuls (2 g) into the vagina 3 (three) times a week. Apply a pea-sized  amount at bedtime, Disp: , Rfl:     estradiol (Vivelle-DOT) 0.1 mg/24 hr patch, Place 2 patches on the skin 2 times a week., Disp: 48 patch, Rfl: 0    glucosamine-chondroitin 500-400 mg capsule, Take by mouth., Disp: , Rfl:     ibuprofen 600 mg tablet, Take 1 tablet (600 mg) by mouth every 4 hours if needed., Disp: , Rfl:     lisinopril 20 mg tablet, TAKE 1 TABLET BY MOUTH EVERY DAY, Disp: 90 tablet, Rfl: 3    magnesium oxide (Mag-Ox) 400 mg tablet, Take 1 tablet (400 mg) by mouth once daily., Disp: , Rfl:     multivitamin tablet, Take 1 tablet by mouth once daily., Disp: , Rfl:     oxyCODONE-acetaminophen (Percocet)  mg tablet, Take 0.5 tablets by mouth every 6 hours if needed for severe pain (7 - 10)., Disp:  "60 tablet, Rfl: 0    pantoprazole (ProtoNix) 40 mg EC tablet, TAKE 1 TABLET (40 MG) BY MOUTH ONCE DAILY IN THE MORNING. TAKE BEFORE MEALS., Disp: 90 tablet, Rfl: 3    phenazopyridine (Pyridium) 100 mg tablet, Take 1 tablet (100mg) by mouth three times daily after meals as needed for urinary burning. Will turn urine orange/red in color, Disp: 9 tablet, Rfl: 0    progesterone (Prometrium) 100 mg capsule, TAKE 1 CAPSULE (100 MG) BY MOUTH ONCE DAILY AT BEDTIME., Disp: 90 capsule, Rfl: 1    s-adenosylmethionine (Manuel-E, Enteric Coated,) 400 mg tablet,delayed release (DR/EC), Take by mouth., Disp: , Rfl:     testosterone 20.25 mg/1.25 gram (1.62 %) gel in metered-dose pump, Apply one pump once weekly, Disp: 75 g, Rfl: 3    timolol (Timoptic) 0.5 % ophthalmic solution, Administer 1 drop into both eyes once daily at bedtime., Disp: , Rfl:     Objective   Resp 16   Ht 1.651 m (5' 5\")   Wt 68.4 kg (150 lb 12.8 oz)   BMI 25.09 kg/m²     Physical Exam  Constitutional:       Appearance: Normal appearance.   HENT:      Mouth/Throat:      Mouth: Mucous membranes are moist.      Pharynx: Oropharynx is clear.   Eyes:      Conjunctiva/sclera: Conjunctivae normal.      Pupils: Pupils are equal, round, and reactive to light.   Cardiovascular:      Rate and Rhythm: Normal rate and regular rhythm.      Heart sounds: Normal heart sounds.   Pulmonary:      Effort: Pulmonary effort is normal.      Breath sounds: Normal breath sounds.   Abdominal:      General: Bowel sounds are normal. There is no distension.      Palpations: Abdomen is soft. There is no mass.      Tenderness: There is no abdominal tenderness.   Lymphadenopathy:      Cervical: No cervical adenopathy.   Skin:     General: Skin is warm and dry.   Neurological:      General: No focal deficit present.         Assessment/Plan     [facility] care visit: discussed increase in cholesterol, recheck lipids again in 6 months.  Pt has epsodes of cervical spine pain that she " uses percocet 10/325, taking only one half tablet every 6 hours as needed. New CSA and urine drug screen completed today. Rx #60 tabs, see me again in one year for annual physical.

## 2024-11-01 LAB
AMPHETAMINES UR QL SCN: ABNORMAL
BARBITURATES UR QL SCN: ABNORMAL
BZE UR QL SCN: ABNORMAL
CANNABINOIDS UR QL SCN: ABNORMAL
CREAT UR-MCNC: 83.7 MG/DL (ref 20–320)
PCP UR QL SCN: ABNORMAL

## 2024-11-04 LAB — CARBOXYTHC UR-MCNC: 31 NG/ML

## 2024-11-10 ENCOUNTER — OFFICE VISIT (OUTPATIENT)
Dept: URGENT CARE | Age: 64
End: 2024-11-10
Payer: COMMERCIAL

## 2024-11-10 VITALS
DIASTOLIC BLOOD PRESSURE: 88 MMHG | SYSTOLIC BLOOD PRESSURE: 159 MMHG | TEMPERATURE: 98 F | OXYGEN SATURATION: 99 % | HEART RATE: 62 BPM | WEIGHT: 152 LBS | HEIGHT: 62 IN | BODY MASS INDEX: 27.97 KG/M2 | RESPIRATION RATE: 18 BRPM

## 2024-11-10 DIAGNOSIS — R10.2 PELVIC PAIN IN FEMALE: ICD-10-CM

## 2024-11-10 DIAGNOSIS — R10.30 LOWER ABDOMINAL PAIN: Primary | ICD-10-CM

## 2024-11-10 LAB
POC APPEARANCE, URINE: CLEAR
POC BILIRUBIN, URINE: NEGATIVE
POC BLOOD, URINE: NEGATIVE
POC COLOR, URINE: YELLOW
POC GLUCOSE, URINE: NEGATIVE MG/DL
POC KETONES, URINE: NEGATIVE MG/DL
POC LEUKOCYTES, URINE: NEGATIVE
POC NITRITE,URINE: NEGATIVE
POC PH, URINE: 6 PH
POC PROTEIN, URINE: NEGATIVE MG/DL
POC SPECIFIC GRAVITY, URINE: <=1.005
POC UROBILINOGEN, URINE: 0.2 EU/DL

## 2024-11-10 PROCEDURE — 81003 URINALYSIS AUTO W/O SCOPE: CPT | Performed by: PHYSICIAN ASSISTANT

## 2024-11-10 PROCEDURE — 3008F BODY MASS INDEX DOCD: CPT | Performed by: PHYSICIAN ASSISTANT

## 2024-11-10 PROCEDURE — 99213 OFFICE O/P EST LOW 20 MIN: CPT | Performed by: PHYSICIAN ASSISTANT

## 2024-11-10 PROCEDURE — 1036F TOBACCO NON-USER: CPT | Performed by: PHYSICIAN ASSISTANT

## 2024-11-10 ASSESSMENT — ENCOUNTER SYMPTOMS
FATIGUE: 0
JOINT SWELLING: 0
ARTHRALGIAS: 0
ROS GI COMMENTS: BLOATING
AGITATION: 0
CONFUSION: 0
DIARRHEA: 0
DYSURIA: 0
NAUSEA: 1
CHEST TIGHTNESS: 0
FEVER: 0
COUGH: 0
SHORTNESS OF BREATH: 0
VOMITING: 0
FREQUENCY: 0
CHILLS: 0
ABDOMINAL PAIN: 1

## 2024-11-10 ASSESSMENT — PAIN SCALES - GENERAL: PAINLEVEL_OUTOF10: 6

## 2024-11-10 NOTE — PROGRESS NOTES
Subjective   Patient ID: Saurabh Patel is a 64 y.o. female. They present today with a chief complaint of Abdominal Pain.    History of Present Illness  Pt presented with acute abdominal pain/discomfort x one week. Pain comes and goes. Also reports mild bloating and nausea. Denies fever, severe pain, V/D, blood in stool, constipation. She was diagnosed with UTI 2wks ago and was treated with Abx. States UTI symptoms improved. PCP ordered stool test and all returned with negative result. Pt has opioids prescribed but only takes it as needed. According to her, rarely.       Abdominal Pain  Associated symptoms include nausea. Pertinent negatives include no arthralgias, diarrhea, dysuria, fever, frequency or vomiting.       Past Medical History  Allergies as of 11/10/2024 - Reviewed 11/10/2024   Allergen Reaction Noted    Clindamycin Diarrhea 02/18/2023    Nortriptyline Other 03/07/2023       (Not in a hospital admission)       Past Medical History:   Diagnosis Date    Anxiety     Arthritis        Past Surgical History:   Procedure Laterality Date    EYE SURGERY  Cataract    OTHER SURGICAL HISTORY  07/26/2022    Eye surgery        reports that she has quit smoking. Her smoking use included cigarettes. She has never been exposed to tobacco smoke. She has never used smokeless tobacco. She reports current alcohol use.    Review of Systems  Review of Systems   Constitutional:  Negative for chills, fatigue and fever.   Respiratory:  Negative for cough, chest tightness and shortness of breath.    Cardiovascular:  Negative for chest pain.   Gastrointestinal:  Positive for abdominal pain and nausea. Negative for diarrhea and vomiting.        Bloating   Genitourinary:  Negative for dysuria, frequency and urgency.   Musculoskeletal:  Negative for arthralgias and joint swelling.   Skin:  Negative for rash.   Psychiatric/Behavioral:  Negative for agitation and confusion.                                   Objective    Vitals:  "   11/10/24 1445   BP: 159/88   Pulse: 62   Resp: 18   Temp: 36.7 °C (98 °F)   TempSrc: Oral   SpO2: 99%   Weight: 68.9 kg (152 lb)   Height: 1.575 m (5' 2\")     No LMP recorded. Patient is postmenopausal.    Physical Exam  Constitutional:       Appearance: Normal appearance.   HENT:      Head: Normocephalic and atraumatic.      Right Ear: Tympanic membrane, ear canal and external ear normal.      Left Ear: Tympanic membrane, ear canal and external ear normal.      Nose: Nose normal.   Cardiovascular:      Rate and Rhythm: Normal rate and regular rhythm.      Heart sounds: No murmur heard.  Pulmonary:      Effort: Pulmonary effort is normal. No respiratory distress.      Breath sounds: No stridor. No wheezing, rhonchi or rales.   Abdominal:      General: Abdomen is flat. Bowel sounds are normal. There is no distension.      Palpations: Abdomen is soft. There is no mass.      Tenderness: There is abdominal tenderness in the suprapubic area. There is no right CVA tenderness, left CVA tenderness, guarding or rebound.   Musculoskeletal:         General: No swelling or tenderness. Normal range of motion.   Neurological:      Mental Status: She is alert and oriented to person, place, and time.   Psychiatric:         Mood and Affect: Mood normal.         Procedures    Point of Care Test & Imaging Results from this visit  Results for orders placed or performed in visit on 11/10/24   POCT UA Automated manually resulted   Result Value Ref Range    POC Color, Urine Yellow Straw, Yellow, Light-Yellow    POC Appearance, Urine Clear Clear    POC Glucose, Urine NEGATIVE NEGATIVE mg/dl    POC Bilirubin, Urine NEGATIVE NEGATIVE    POC Ketones, Urine NEGATIVE NEGATIVE mg/dl    POC Specific Gravity, Urine <=1.005 1.005 - 1.035    POC Blood, Urine NEGATIVE NEGATIVE    POC PH, Urine 6.0 No Reference Range Established PH    POC Protein, Urine NEGATIVE NEGATIVE, 30 (1+) mg/dl    POC Urobilinogen, Urine 0.2 0.2, 1.0 EU/DL    Poc Nitrite, " Urine NEGATIVE NEGATIVE    POC Leukocytes, Urine NEGATIVE NEGATIVE      No results found.    Diagnostic study results (if any) were reviewed by Jailene Lemos PA-C.    Assessment/Plan   Allergies, medications, history, and pertinent labs/EKGs/Imaging reviewed by Jailene Lemos PA-C.     Medical Decision Making  Mild suprapubic tenderness, VSS, urine dip  Stool test performed at [facility] no acute findings  Low suspicion for acute abdomen based on symptoms and exam findings but educated warning s/s and when need to seek emergent medical intervention      Orders and Diagnoses  Diagnoses and all orders for this visit:  Lower abdominal pain  Pelvic pain in female  -     POCT UA Automated manually resulted      Medical Admin Record      Patient disposition: Home    Electronically signed by Jailene Lemos PA-C  3:19 PM

## 2024-11-10 NOTE — PATIENT INSTRUCTIONS
Hydration, OTC Pepto bismol as needed, continue home regimens  Closely monitor symptoms and ER for red flags  F/U with GI/PCP ASAP to reassess condition

## 2024-11-19 ENCOUNTER — HOSPITAL ENCOUNTER (OUTPATIENT)
Dept: RADIOLOGY | Facility: CLINIC | Age: 64
Discharge: HOME | End: 2024-11-19
Payer: COMMERCIAL

## 2024-11-19 ENCOUNTER — OFFICE VISIT (OUTPATIENT)
Dept: ORTHOPEDIC SURGERY | Facility: CLINIC | Age: 64
End: 2024-11-19
Payer: COMMERCIAL

## 2024-11-19 DIAGNOSIS — M79.671 FOOT PAIN, BILATERAL: ICD-10-CM

## 2024-11-19 DIAGNOSIS — M79.672 FOOT PAIN, BILATERAL: ICD-10-CM

## 2024-11-19 DIAGNOSIS — M20.22 HALLUX RIGIDUS OF LEFT FOOT: Primary | ICD-10-CM

## 2024-11-19 DIAGNOSIS — M20.21 HALLUX RIGIDUS OF RIGHT FOOT: ICD-10-CM

## 2024-11-19 PROCEDURE — 73630 X-RAY EXAM OF FOOT: CPT | Mod: 50

## 2024-11-19 PROCEDURE — 99214 OFFICE O/P EST MOD 30 MIN: CPT | Performed by: ORTHOPAEDIC SURGERY

## 2024-11-19 PROCEDURE — 73630 X-RAY EXAM OF FOOT: CPT | Mod: BILATERAL PROCEDURE | Performed by: STUDENT IN AN ORGANIZED HEALTH CARE EDUCATION/TRAINING PROGRAM

## 2024-11-19 NOTE — PROGRESS NOTES
History of Present Illness  64 y.o.female here for fuv b/l foot pain  1. Hallux rigidus of left foot        2. Foot pain, bilateral  XR foot 3+ views bilateral      3. Hallux rigidus of right foot          Mechanism of injury: none  Date of Injury/Pain: ongoing for years  Location of pain: great toe  Frequency of Pain: worse with prolonged walking  Associated symptoms? Swelling.  Previous treatment?   Orthotic, Kamara's extension  Presents today in flats    27 point review of systems negative except what is stated in HPI    Past Medical History:   Diagnosis Date    Anxiety     Arthritis         Allergies   Allergen Reactions    Clindamycin Diarrhea    Nortriptyline Other        Past Surgical History:   Procedure Laterality Date    EYE SURGERY  Cataract    OTHER SURGICAL HISTORY  07/26/2022    Eye surgery        Family History   Problem Relation Name Age of Onset    Multiple myeloma Mother      Heart failure Father          Social History     Socioeconomic History    Marital status: Single     Spouse name: Not on file    Number of children: Not on file    Years of education: Not on file    Highest education level: Not on file   Occupational History    Not on file   Tobacco Use    Smoking status: Former     Types: Cigarettes     Passive exposure: Never    Smokeless tobacco: Never   Substance and Sexual Activity    Alcohol use: Yes    Drug use: Not on file    Sexual activity: Not on file   Other Topics Concern    Not on file   Social History Narrative    Not on file     Social Drivers of Health     Financial Resource Strain: Low Risk  (8/22/2022)    Received [facility] [place] [facility] [employer] [facility]    Overall Financial Resource Strain (CARDIA)     Difficulty of Paying Living Expenses: Not hard at all   Food Insecurity: No Food Insecurity (8/22/2022)    Received from [facility] [place] [facility] [employer] [facility]    Hunger Vital Sign     Worried About Running Out  of Food in the Last Year: Never true     Ran Out of Food in the Last Year: Never true   Transportation Needs: No Transportation Needs (10/4/2021)    Received from [facility]    PRAPARE - Transportation     Lack of Transportation (Medical): No     Lack of Transportation (Non-Medical): No   Physical Activity: Sufficiently Active (10/4/2021)    Received from [facility]    Exercise Vital Sign     Days of Exercise per Week: 3 days     Minutes of Exercise per Session: 60 min   Stress: Stress Concern Present (10/4/2021)    Received from [facility]    Citizen of [place] of Occupational Health - Occupational Stress Questionnaire     Feeling of Stress : To some extent   Social Connections: Unknown (10/4/2021)    Received from [facility]    Social Connection and Isolation Panel [NHANES]     Frequency of Communication with Friends and Family: Twice a week     Frequency of Social Gatherings with Friends and Family: Never     Attends Zoroastrianism Services: Not on file     Active Member of Clubs or Organizations: Not on file     Attends Club or Organization Meetings: 1 to 4 times per year     Marital Status:    Intimate Partner Violence: Not on file   Housing Stability: Unknown (10/4/2021)    Received from [facility]    Housing Stability Vital Sign     Unable to Pay for Housing in the Last Year: Not on file     Number of Places Lived in the Last Year: Not on file     Unstable Housing in the Last Year: No        CURRENT MEDICATIONS:   Current Outpatient Medications   Medication Sig Dispense Refill    albuterol 90 mcg/actuation inhaler One or two puffs every 4 hours as needed for wheezing or shortness of breath. 18 g 1    alpha lipoic acid 300 mg capsule Take by mouth.      ALPRAZolam (Xanax) 0.25 mg tablet Take 1 tablet (0.25 mg) by mouth 3 times a day as needed for anxiety. 30 tablet 0    ascorbic acid  (Vitamin C) 1,000 mg tablet Take 1 tablet (1,000 mg) by mouth once daily.      b complex vitamins capsule Take by mouth.      cyclobenzaprine (Flexeril) 10 mg tablet Take 1 tablet (10 mg) by mouth 3 times a day as needed.      estradiol (Estrace) 0.01 % (0.1 mg/gram) vaginal cream Insert 0.5 Applicatorfuls (2 g) into the vagina 3 (three) times a week. Apply a pea-sized  amount at bedtime      estradiol (Vivelle-DOT) 0.1 mg/24 hr patch Place 2 patches on the skin 2 times a week. 48 patch 0    glucosamine-chondroitin 500-400 mg capsule Take by mouth.      ibuprofen 600 mg tablet Take 1 tablet (600 mg) by mouth every 4 hours if needed.      lisinopril 20 mg tablet TAKE 1 TABLET BY MOUTH EVERY DAY 90 tablet 3    magnesium oxide (Mag-Ox) 400 mg tablet Take 1 tablet (400 mg) by mouth once daily.      multivitamin tablet Take 1 tablet by mouth once daily.      oxyCODONE-acetaminophen (Percocet)  mg tablet Take 0.5 tablets by mouth every 6 hours if needed for severe pain (7 - 10). 60 tablet 0    pantoprazole (ProtoNix) 40 mg EC tablet TAKE 1 TABLET (40 MG) BY MOUTH ONCE DAILY IN THE MORNING. TAKE BEFORE MEALS. 90 tablet 3    phenazopyridine (Pyridium) 100 mg tablet Take 1 tablet (100mg) by mouth three times daily after meals as needed for urinary burning. Will turn urine orange/red in color 9 tablet 0    progesterone (Prometrium) 100 mg capsule TAKE 1 CAPSULE (100 MG) BY MOUTH ONCE DAILY AT BEDTIME. 90 capsule 1    s-adenosylmethionine (Manuel-E, Enteric Coated,) 400 mg tablet,delayed release (DR/EC) Take by mouth.      testosterone 20.25 mg/1.25 gram (1.62 %) gel in metered-dose pump Apply one pump once weekly 75 g 3    timolol (Timoptic) 0.5 % ophthalmic solution Administer 1 drop into both eyes once daily at bedtime.       No current facility-administered medications for this visit.        Constitutional Exam: patient's height and weight reviewed, well-kempt  Psychiatric Exam: alert and oriented x 3, appropriate mood  "and behavior  Eye Exam: SHREYAS, EOMI  Pulmonary Exam: breathing non-labored, no apparent distress  Lymphatic exam: no appreciable lymphadenopathy in the lower extremities  Cardiovascular exam: DP pulses 2+ bilaterally, PT 2+ bilaterally, toes are pink with good capillary refill, no pitting edema  Skin exam: no open lesions, rashes, abrasions or ulcerations  Neurological exam: sensation to light touch intact in both lower extremities in peripheral and dermatomal distributions (except for any abnormalities noted in musculoskeletal exam)     PHYSICAL EXAM  On examination of the left ankle/foot:  Normal gait  Normal alignment  Minimal swelling; No ecchymosis or erythema. Skin intact; no ulcers or lesions.   Normal ROM in  ankle plantarflexion, dorsiflexion, inversion and eversion; normal ROM in 2-5th toe flexion/extension and decreased 1st MTP flexion/extension  Normal strength in ankle plantarflexion, dorsiflexion, inversion and eversion; normal strength with great toe flexion/extension  Tenderness to palpation: minimal over 1st MTP joint  No tenderness to palpation over the Achilles, medial and lateral malleolus, posterior tibial tendon, peroneal tendon, ATFL, deltoid ligament, talus or navicular.  no tenderness to palpation over heel, plantar arch, base of 1st metatarsal/2nd metatarsal, sesamoids, 5th metatarsal, medial cuneiform, navicular, or 2nd or 3rd intermetarsal space.  Neurovascularly intact. Good capillary refill. No sensory deficit to light touch. Normal proprioception. Dorsalis pedis and posterior tibial pulses 2+ bilaterally.    - Gibbons's test                              - Dorsiflexion-eversion test  - Anterior Drawer test                      - resisted dorsiflexion-eversion test  - Talar tilt test                                    - shuck testing  - Squeeze test  - \"too many toes\" sign    On examination of the right ankle/foot:  Normal gait  Normal alignment  Minimal swelling; No ecchymosis or " "erythema. Skin intact; no ulcers or lesions.   Normal ROM in  ankle plantarflexion, dorsiflexion, inversion and eversion; normal ROM in 2-5th toe flexion/extension and decreased 1st MTP flexion/extension  Normal strength in ankle plantarflexion, dorsiflexion, inversion and eversion; normal strength with great toe flexion/extension  Tenderness to palpation: none over 1st MTP joint  No tenderness to palpation over the Achilles, medial and lateral malleolus, posterior tibial tendon, peroneal tendon, ATFL, deltoid ligament, talus or navicular.  no tenderness to palpation over heel, plantar arch, base of 1st metatarsal/2nd metatarsal, sesamoids, 5th metatarsal, medial cuneiform, navicular, or 2nd or 3rd intermetarsal space.  Neurovascularly intact. Good capillary refill. No sensory deficit to light touch. Normal proprioception. Dorsalis pedis and posterior tibial pulses 2+ bilaterally.    - Gibbons's test                              - Dorsiflexion-eversion test  - Anterior Drawer test                      - resisted dorsiflexion-eversion test  - Talar tilt test                                    - shuck testing  - Squeeze test  - \"too many toes\" sign    DATA/RESULTS  I personally reviewed the patient's x-ray images and reports of the left and right foot. The xrays show no fractures or dislocation.  Severe degenerative changes of the 1st MTP joint    ASSESSMENT: b/l foot hallux rigidus    PLAN: I discussed with the patient the differential diagnosis, complex overuse and degenerative related nature of the condition(s) and available treatment option(s). Patient is ambulatory and was given a prescription for orthotics with Kamara's extension carbon fiber plate, which are medically necessary due to the patient's medical condition and symptomatic posterior tibial tendon dysfunction. Patient was given a handout and instructed on an at home stretching program.  They should do these exercises 3 times per day for 6 weeks and " then daily. Patient can use OTC Voltaren gel, biofreeze or  aspercream for pain and continue to ice and elevate supported at the calf to reduce swelling. She was instructed on shoe wear. All the patient's questions were answered. The patient agrees with the above plan.  Follow up as needed

## 2024-11-20 ENCOUNTER — PATIENT MESSAGE (OUTPATIENT)
Dept: PRIMARY CARE | Facility: CLINIC | Age: 64
End: 2024-11-20
Payer: COMMERCIAL

## 2024-11-20 DIAGNOSIS — I10 BENIGN ESSENTIAL HTN: ICD-10-CM

## 2024-11-20 DIAGNOSIS — F52.0 HYPOACTIVE SEXUAL DESIRE DISORDER: ICD-10-CM

## 2024-11-20 DIAGNOSIS — K21.9 GASTROESOPHAGEAL REFLUX DISEASE WITHOUT ESOPHAGITIS: ICD-10-CM

## 2024-11-20 DIAGNOSIS — F64.9 GENDER DYSPHORIA: ICD-10-CM

## 2024-11-20 RX ORDER — LISINOPRIL 20 MG/1
20 TABLET ORAL DAILY
Qty: 90 TABLET | Refills: 3 | Status: SHIPPED | OUTPATIENT
Start: 2024-11-20

## 2024-11-20 RX ORDER — PANTOPRAZOLE SODIUM 40 MG/1
40 TABLET, DELAYED RELEASE ORAL
Qty: 90 TABLET | Refills: 3 | Status: SHIPPED | OUTPATIENT
Start: 2024-11-20

## 2024-11-21 RX ORDER — ESTRADIOL 0.1 MG/D
2 FILM, EXTENDED RELEASE TRANSDERMAL 2 TIMES WEEKLY
Qty: 48 PATCH | Refills: 0 | Status: SHIPPED | OUTPATIENT
Start: 2024-11-21 | End: 2025-05-08

## 2024-11-21 RX ORDER — TESTOSTERONE 20.25 MG/1.25G
GEL TOPICAL
Qty: 75 G | Refills: 0 | Status: SHIPPED | OUTPATIENT
Start: 2024-11-21

## 2024-11-21 RX ORDER — ULTRASOUND COUPLING MEDIUM
GEL (GRAM) TOPICAL AS NEEDED
Qty: 56.7 G | Refills: 1 | Status: SHIPPED | OUTPATIENT
Start: 2024-11-21 | End: 2024-12-01

## 2024-11-21 RX ORDER — ESTRADIOL 0.1 MG/G
2 CREAM VAGINAL 3 TIMES WEEKLY
Qty: 42.5 G | Refills: 0 | Status: SHIPPED | OUTPATIENT
Start: 2024-11-22 | End: 2024-11-25

## 2024-11-25 RX ORDER — ESTRADIOL 0.1 MG/G
CREAM VAGINAL
Qty: 42.5 G | Refills: 0 | Status: SHIPPED | OUTPATIENT
Start: 2024-11-25

## 2024-12-03 DIAGNOSIS — R39.9 URINARY SYMPTOM OR SIGN: ICD-10-CM

## 2024-12-03 DIAGNOSIS — F64.9 GENDER DYSPHORIA: ICD-10-CM

## 2024-12-03 RX ORDER — TESTOSTERONE 20.25 MG/1.25G
GEL TOPICAL
Qty: 75 G | Refills: 0 | Status: SHIPPED | OUTPATIENT
Start: 2024-12-03

## 2024-12-05 ENCOUNTER — LAB (OUTPATIENT)
Dept: LAB | Facility: LAB | Age: 64
End: 2024-12-05
Payer: COMMERCIAL

## 2024-12-05 DIAGNOSIS — R39.9 URINARY SYMPTOM OR SIGN: ICD-10-CM

## 2024-12-05 PROCEDURE — 87086 URINE CULTURE/COLONY COUNT: CPT

## 2024-12-05 PROCEDURE — 87186 SC STD MICRODIL/AGAR DIL: CPT

## 2024-12-06 DIAGNOSIS — N39.0 ACUTE URINARY TRACT INFECTION: ICD-10-CM

## 2024-12-06 RX ORDER — CIPROFLOXACIN 500 MG/1
500 TABLET ORAL 2 TIMES DAILY
Qty: 14 TABLET | Refills: 0 | Status: SHIPPED | OUTPATIENT
Start: 2024-12-06 | End: 2024-12-13

## 2024-12-08 LAB — BACTERIA UR CULT: ABNORMAL

## 2024-12-09 ENCOUNTER — PATIENT MESSAGE (OUTPATIENT)
Dept: PRIMARY CARE | Facility: CLINIC | Age: 64
End: 2024-12-09

## 2024-12-09 ENCOUNTER — TELEPHONE (OUTPATIENT)
Dept: PRIMARY CARE | Facility: CLINIC | Age: 64
End: 2024-12-09

## 2024-12-09 DIAGNOSIS — K21.9 GASTROESOPHAGEAL REFLUX DISEASE WITHOUT ESOPHAGITIS: ICD-10-CM

## 2024-12-09 RX ORDER — PANTOPRAZOLE SODIUM 20 MG/1
20 TABLET, DELAYED RELEASE ORAL
Qty: 90 TABLET | Refills: 1 | Status: CANCELLED | OUTPATIENT
Start: 2024-12-09

## 2024-12-09 NOTE — TELEPHONE ENCOUNTER
Patient calling for 2 med questions      She said the pantoprozle should be 20 mg  Not 40 mg    Also, would like to know if she can get a lower dose of lisinopril to start weaning off if it.       [facility]  [phone]    Both to [facility]

## 2024-12-11 DIAGNOSIS — R39.9 URINARY SYMPTOM OR SIGN: ICD-10-CM

## 2024-12-16 ENCOUNTER — LAB (OUTPATIENT)
Dept: LAB | Facility: LAB | Age: 64
End: 2024-12-16
Payer: COMMERCIAL

## 2024-12-16 DIAGNOSIS — R39.9 URINARY SYMPTOM OR SIGN: ICD-10-CM

## 2024-12-16 PROCEDURE — 87086 URINE CULTURE/COLONY COUNT: CPT

## 2024-12-18 LAB — BACTERIA UR CULT: NORMAL

## 2024-12-19 VITALS
RESPIRATION RATE: 10 BRPM | SYSTOLIC BLOOD PRESSURE: 106 MMHG | HEART RATE: 83 BPM | HEART RATE: 89 BPM | RESPIRATION RATE: 15 BRPM | SYSTOLIC BLOOD PRESSURE: 112 MMHG | SYSTOLIC BLOOD PRESSURE: 128 MMHG | SYSTOLIC BLOOD PRESSURE: 153 MMHG | DIASTOLIC BLOOD PRESSURE: 66 MMHG | HEART RATE: 89 BPM | HEART RATE: 89 BPM | SYSTOLIC BLOOD PRESSURE: 133 MMHG | WEIGHT: 150 LBS | SYSTOLIC BLOOD PRESSURE: 102 MMHG | RESPIRATION RATE: 12 BRPM | DIASTOLIC BLOOD PRESSURE: 52 MMHG | SYSTOLIC BLOOD PRESSURE: 133 MMHG | DIASTOLIC BLOOD PRESSURE: 53 MMHG | DIASTOLIC BLOOD PRESSURE: 58 MMHG | SYSTOLIC BLOOD PRESSURE: 104 MMHG | OXYGEN SATURATION: 98 % | RESPIRATION RATE: 17 BRPM | HEART RATE: 80 BPM | OXYGEN SATURATION: 99 % | HEART RATE: 73 BPM | DIASTOLIC BLOOD PRESSURE: 62 MMHG | DIASTOLIC BLOOD PRESSURE: 68 MMHG | HEIGHT: 65 IN | HEART RATE: 79 BPM | SYSTOLIC BLOOD PRESSURE: 106 MMHG | RESPIRATION RATE: 11 BRPM | RESPIRATION RATE: 14 BRPM | SYSTOLIC BLOOD PRESSURE: 116 MMHG | OXYGEN SATURATION: 99 % | SYSTOLIC BLOOD PRESSURE: 112 MMHG | HEIGHT: 65 IN | OXYGEN SATURATION: 100 % | HEART RATE: 73 BPM | SYSTOLIC BLOOD PRESSURE: 131 MMHG | RESPIRATION RATE: 15 BRPM | HEART RATE: 65 BPM | RESPIRATION RATE: 12 BRPM | SYSTOLIC BLOOD PRESSURE: 110 MMHG | OXYGEN SATURATION: 98 % | SYSTOLIC BLOOD PRESSURE: 105 MMHG | RESPIRATION RATE: 17 BRPM | HEART RATE: 93 BPM | SYSTOLIC BLOOD PRESSURE: 110 MMHG | DIASTOLIC BLOOD PRESSURE: 48 MMHG | RESPIRATION RATE: 11 BRPM | HEART RATE: 83 BPM | HEART RATE: 78 BPM | RESPIRATION RATE: 18 BRPM | DIASTOLIC BLOOD PRESSURE: 70 MMHG | SYSTOLIC BLOOD PRESSURE: 133 MMHG | HEART RATE: 93 BPM | SYSTOLIC BLOOD PRESSURE: 104 MMHG | HEART RATE: 79 BPM | SYSTOLIC BLOOD PRESSURE: 158 MMHG | DIASTOLIC BLOOD PRESSURE: 52 MMHG | RESPIRATION RATE: 18 BRPM | HEART RATE: 80 BPM | SYSTOLIC BLOOD PRESSURE: 106 MMHG | HEART RATE: 78 BPM | DIASTOLIC BLOOD PRESSURE: 51 MMHG | RESPIRATION RATE: 14 BRPM | RESPIRATION RATE: 15 BRPM | DIASTOLIC BLOOD PRESSURE: 51 MMHG | RESPIRATION RATE: 11 BRPM | DIASTOLIC BLOOD PRESSURE: 56 MMHG | DIASTOLIC BLOOD PRESSURE: 48 MMHG | HEART RATE: 81 BPM | HEART RATE: 81 BPM | WEIGHT: 150 LBS | SYSTOLIC BLOOD PRESSURE: 101 MMHG | DIASTOLIC BLOOD PRESSURE: 54 MMHG | DIASTOLIC BLOOD PRESSURE: 68 MMHG | HEART RATE: 93 BPM | DIASTOLIC BLOOD PRESSURE: 70 MMHG | DIASTOLIC BLOOD PRESSURE: 53 MMHG | SYSTOLIC BLOOD PRESSURE: 158 MMHG | DIASTOLIC BLOOD PRESSURE: 54 MMHG | SYSTOLIC BLOOD PRESSURE: 110 MMHG | HEART RATE: 66 BPM | HEART RATE: 65 BPM | DIASTOLIC BLOOD PRESSURE: 51 MMHG | HEART RATE: 80 BPM | OXYGEN SATURATION: 100 % | RESPIRATION RATE: 14 BRPM | DIASTOLIC BLOOD PRESSURE: 66 MMHG | SYSTOLIC BLOOD PRESSURE: 128 MMHG | SYSTOLIC BLOOD PRESSURE: 158 MMHG | HEIGHT: 65 IN | DIASTOLIC BLOOD PRESSURE: 53 MMHG | DIASTOLIC BLOOD PRESSURE: 62 MMHG | RESPIRATION RATE: 12 BRPM | SYSTOLIC BLOOD PRESSURE: 112 MMHG | RESPIRATION RATE: 17 BRPM | SYSTOLIC BLOOD PRESSURE: 131 MMHG | RESPIRATION RATE: 10 BRPM | DIASTOLIC BLOOD PRESSURE: 80 MMHG | SYSTOLIC BLOOD PRESSURE: 105 MMHG | RESPIRATION RATE: 9 BRPM | SYSTOLIC BLOOD PRESSURE: 153 MMHG | WEIGHT: 150 LBS | HEART RATE: 66 BPM | DIASTOLIC BLOOD PRESSURE: 56 MMHG | DIASTOLIC BLOOD PRESSURE: 48 MMHG | SYSTOLIC BLOOD PRESSURE: 105 MMHG | SYSTOLIC BLOOD PRESSURE: 104 MMHG | HEART RATE: 83 BPM | SYSTOLIC BLOOD PRESSURE: 102 MMHG | DIASTOLIC BLOOD PRESSURE: 66 MMHG | DIASTOLIC BLOOD PRESSURE: 49 MMHG | DIASTOLIC BLOOD PRESSURE: 49 MMHG | HEART RATE: 66 BPM | DIASTOLIC BLOOD PRESSURE: 70 MMHG | HEART RATE: 73 BPM | SYSTOLIC BLOOD PRESSURE: 128 MMHG | DIASTOLIC BLOOD PRESSURE: 62 MMHG | SYSTOLIC BLOOD PRESSURE: 116 MMHG | RESPIRATION RATE: 10 BRPM | SYSTOLIC BLOOD PRESSURE: 101 MMHG | TEMPERATURE: 97.2 F | DIASTOLIC BLOOD PRESSURE: 58 MMHG | DIASTOLIC BLOOD PRESSURE: 80 MMHG | SYSTOLIC BLOOD PRESSURE: 116 MMHG | SYSTOLIC BLOOD PRESSURE: 153 MMHG | DIASTOLIC BLOOD PRESSURE: 80 MMHG | HEART RATE: 65 BPM | RESPIRATION RATE: 9 BRPM | OXYGEN SATURATION: 98 % | DIASTOLIC BLOOD PRESSURE: 68 MMHG | HEART RATE: 79 BPM | SYSTOLIC BLOOD PRESSURE: 131 MMHG | DIASTOLIC BLOOD PRESSURE: 54 MMHG | OXYGEN SATURATION: 99 % | HEART RATE: 78 BPM | DIASTOLIC BLOOD PRESSURE: 58 MMHG | HEART RATE: 81 BPM | TEMPERATURE: 97.2 F | SYSTOLIC BLOOD PRESSURE: 101 MMHG | DIASTOLIC BLOOD PRESSURE: 49 MMHG | RESPIRATION RATE: 18 BRPM | TEMPERATURE: 97.2 F | DIASTOLIC BLOOD PRESSURE: 56 MMHG | OXYGEN SATURATION: 100 % | SYSTOLIC BLOOD PRESSURE: 102 MMHG | DIASTOLIC BLOOD PRESSURE: 52 MMHG | RESPIRATION RATE: 9 BRPM

## 2024-12-19 DIAGNOSIS — R39.9 URINARY SYMPTOM OR SIGN: ICD-10-CM

## 2024-12-19 DIAGNOSIS — F64.9 GENDER DYSPHORIA: ICD-10-CM

## 2024-12-20 ENCOUNTER — LAB (OUTPATIENT)
Dept: LAB | Facility: LAB | Age: 64
End: 2024-12-20
Payer: COMMERCIAL

## 2024-12-20 DIAGNOSIS — R39.9 URINARY SYMPTOM OR SIGN: ICD-10-CM

## 2024-12-20 PROCEDURE — 87086 URINE CULTURE/COLONY COUNT: CPT

## 2024-12-20 RX ORDER — ESTRADIOL 0.1 MG/G
CREAM VAGINAL
Qty: 42.5 G | Refills: 0 | Status: SHIPPED | OUTPATIENT
Start: 2024-12-20

## 2024-12-21 LAB — BACTERIA UR CULT: NO GROWTH

## 2024-12-26 ENCOUNTER — TELEPHONE (OUTPATIENT)
Dept: PRIMARY CARE | Facility: CLINIC | Age: 64
End: 2024-12-26
Payer: COMMERCIAL

## 2024-12-26 NOTE — TELEPHONE ENCOUNTER
Patient calling because she had blood drawn at the [facility] last week and her arm is still bruised where the blood was drawn.   She wants to know what she can do to make it go away    [place] [phone]

## 2024-12-30 ENCOUNTER — AMBULATORY SURGICAL CENTER (OUTPATIENT)
Dept: URBAN - METROPOLITAN AREA SURGERY 12 | Facility: SURGERY | Age: 64
End: 2024-12-30
Payer: COMMERCIAL

## 2024-12-30 ENCOUNTER — OFFICE (OUTPATIENT)
Dept: URBAN - METROPOLITAN AREA PATHOLOGY 2 | Facility: PATHOLOGY | Age: 64
End: 2024-12-30
Payer: COMMERCIAL

## 2024-12-30 DIAGNOSIS — K31.89 OTHER DISEASES OF STOMACH AND DUODENUM: ICD-10-CM

## 2024-12-30 DIAGNOSIS — K62.5 HEMORRHAGE OF ANUS AND RECTUM: ICD-10-CM

## 2024-12-30 DIAGNOSIS — K21.00 GASTRO-ESOPHAGEAL REFLUX DISEASE WITH ESOPHAGITIS, WITHOUT B: ICD-10-CM

## 2024-12-30 DIAGNOSIS — K22.89 OTHER SPECIFIED DISEASE OF ESOPHAGUS: ICD-10-CM

## 2024-12-30 DIAGNOSIS — K64.8 OTHER HEMORRHOIDS: ICD-10-CM

## 2024-12-30 DIAGNOSIS — Z86.0101 PERSONAL HISTORY OF ADENOMATOUS AND SERRATED COLON POLYPS: ICD-10-CM

## 2024-12-30 DIAGNOSIS — R49.0 DYSPHONIA: ICD-10-CM

## 2024-12-30 DIAGNOSIS — R19.4 CHANGE IN BOWEL HABIT: ICD-10-CM

## 2024-12-30 DIAGNOSIS — K44.9 DIAPHRAGMATIC HERNIA WITHOUT OBSTRUCTION OR GANGRENE: ICD-10-CM

## 2024-12-30 DIAGNOSIS — K57.30 DIVERTICULOSIS OF LARGE INTESTINE WITHOUT PERFORATION OR ABS: ICD-10-CM

## 2024-12-30 DIAGNOSIS — K21.9 GASTRO-ESOPHAGEAL REFLUX DISEASE WITHOUT ESOPHAGITIS: ICD-10-CM

## 2024-12-30 PROCEDURE — 88313 SPECIAL STAINS GROUP 2: CPT | Performed by: PATHOLOGY

## 2024-12-30 PROCEDURE — 45380 COLONOSCOPY AND BIOPSY: CPT | Performed by: INTERNAL MEDICINE

## 2024-12-30 PROCEDURE — 43239 EGD BIOPSY SINGLE/MULTIPLE: CPT | Mod: 51 | Performed by: INTERNAL MEDICINE

## 2024-12-30 PROCEDURE — 88305 TISSUE EXAM BY PATHOLOGIST: CPT | Performed by: PATHOLOGY

## 2024-12-30 PROCEDURE — 88342 IMHCHEM/IMCYTCHM 1ST ANTB: CPT | Performed by: PATHOLOGY

## 2025-02-07 ENCOUNTER — APPOINTMENT (OUTPATIENT)
Dept: UROLOGY | Facility: CLINIC | Age: 65
End: 2025-02-07
Payer: COMMERCIAL

## 2025-02-19 NOTE — PROGRESS NOTES
FOLLOW-UP VISIT       HISTORY OF PRESENT ILLNESS:   Saurabh Patel is a 65 y.o. female who presents to me today for annual follow-up. Patient reports having 2 UTI's back to back that caused great discomfort. Reports the antibiotics were hard on her digestive system and experienced intense abdominal cramping that was persistent. Patient had a colonoscopy and endoscopy at [facility] which demonstrated mild diverticulitis. Since the UTI's, she continues to experience daily abdominal cramping but denies concern with bowel movements. She is inquiring about the possibility of having pelvic floor dysfunction vs a low grade UTI.     PSA testing was completed on 9/18/24 which demonstrated PSA levels of <0.10.     As far as HRT goes, she is doing well with her current dose of estradiol patches (0.1 mg).     PAST MEDICAL HISTORY:  Past Medical History:   Diagnosis Date    Anxiety     Arthritis        PAST SURGICAL HISTORY:  Past Surgical History:   Procedure Laterality Date    EYE SURGERY  Cataract    OTHER SURGICAL HISTORY  07/26/2022    Eye surgery        ALLERGIES:   Allergies   Allergen Reactions    Clindamycin Diarrhea    Nortriptyline Other        MEDICATIONS:   Medication Documentation Review Audit       Reviewed by Pili Sharma MA (Medical Assistant) on 02/20/25 at 1018      Medication Order Taking? Sig Documenting Provider Last Dose Status   albuterol 90 mcg/actuation inhaler [id number] No One or two puffs every 4 hours as needed for wheezing or shortness of breath. Lauren Cedeño,  Taking Active   alpha lipoic acid 300 mg capsule [id number] No Take by mouth. Historical Provider, MD Taking Active   ascorbic acid (Vitamin C) 1,000 mg tablet [id number] No Take 1 tablet (1,000 mg) by mouth once daily. Historical Provider, MD Taking Active   b complex vitamins capsule [id number] No Take by mouth. Historical Provider, MD Taking Active   cyclobenzaprine (Flexeril) 10 mg tablet [id number] No Take 1  tablet (10 mg) by mouth 3 times a day as needed. Historical Provider, MD Taking Active   estradiol (Estrace) 0.01 % (0.1 mg/gram) vaginal cream [id number]  APPLY A PEA-SIZED AMOUNT   VAGINALLY 3 TIMES A WEEK ATBEDTIME Radha Olivier MD MPH  Active   estradiol (Vivelle-DOT) 0.1 mg/24 hr patch [id number]  Place 2 patches on the skin 2 times a week. Radha Olivier MD MPH  Active   glucosamine-chondroitin 500-400 mg capsule [id number] No Take by mouth. Historical Provider, MD Taking Active   ibuprofen 600 mg tablet [id number] No Take 1 tablet (600 mg) by mouth every 4 hours if needed. Historical Provider, MD Taking Active   lisinopril 20 mg tablet [id number]  Take 1 tablet (20 mg) by mouth once daily. Lauren Cedeño, DO  Active   magnesium oxide (Mag-Ox) 400 mg tablet [id number] No Take 1 tablet (400 mg) by mouth once daily. Jacy Provider, MD Taking Active   multivitamin tablet [id number] No Take 1 tablet by mouth once daily. Historical Provider, MD Taking Active   oxyCODONE-acetaminophen (Percocet)  mg tablet [id number]  Take 0.5 tablets by mouth every 6 hours if needed for severe pain (7 - 10). Lauren Cedeño, DO  Active   pantoprazole (ProtoNix) 40 mg EC tablet [id number]  Take 1 tablet (40 mg) by mouth once daily in the morning. Take before meals. Lauren Cedeño, DO  Active   phenazopyridine (Pyridium) 100 mg tablet [id number]  Take 1 tablet (100mg) by mouth three times daily after meals as needed for urinary burning. Will turn urine orange/red in color Tia SEAN Sesay-CNP  Active   progesterone (Prometrium) 100 mg capsule [id number]  TAKE 1 CAPSULE (100 MG) BY MOUTH ONCE DAILY AT BEDTIME. Radha Olivier MD MPH  Active   s-adenosylmethionine (Manuel-E, Enteric Coated,) 400 mg tablet,delayed release (DR/EC) [id number] No Take by mouth. Jacy Provider, MD Not Taking Active   testosterone 20.25 mg/1.25 gram (1.62 %) gel in metered-dose pump [id number]  Apply one pump once weekly Radha Olivier MD MPH  Active   timolol (Timoptic)  0.5 % ophthalmic solution [id number] No Administer 1 drop into both eyes once daily at bedtime. Historical Provider, MD Taking Active                     SOCIAL HISTORY:  Patient  reports that she has quit smoking. Her smoking use included cigarettes. She has never been exposed to tobacco smoke. She has never used smokeless tobacco. She reports current alcohol use.   Social History     Socioeconomic History    Marital status: Single     Spouse name: Not on file    Number of children: Not on file    Years of education: Not on file    Highest education level: Not on file   Occupational History    Not on file   Tobacco Use    Smoking status: Former     Types: Cigarettes     Passive exposure: Never    Smokeless tobacco: Never   Substance and Sexual Activity    Alcohol use: Yes    Drug use: Not on file    Sexual activity: Not on file   Other Topics Concern    Not on file   Social History Narrative    Not on file     Social Drivers of Health     Financial Resource Strain: Low Risk  (8/22/2022)    [facility]    Overall Financial Resource Strain (CARDIA)     Difficulty of Paying Living Expenses: Not hard at all   Food Insecurity: No Food Insecurity (8/22/2022)    [facility]    Hunger Vital Sign     Worried About Running Out of Food in the Last Year: Never true     Ran Out of Food in the Last Year: Never true   Transportation Needs: No Transportation Needs (10/4/2021)    Received from [facility]    PRAPARE - Transportation     Lack of Transportation (Medical): No     Lack of Transportation (Non-Medical): No   Physical Activity: Sufficiently Active (10/4/2021)    Received from [facility]    Exercise Vital Sign     Days of Exercise per Week: 3 days     Minutes of Exercise per Session: 60 min   Stress: Stress Concern Present (10/4/2021)     Received from [facility] [place] of [facility]ational [facility] - Occupational Stress Questionnaire     Feeling of Stress : To some extent   Social Connections: Unknown (10/4/2021)    Received from [facility]    Social Connection and Isolation Panel [NHANES]     Frequency of Communication with Friends and Family: Twice a week     Frequency of Social Gatherings with Friends and Family: Never     Attends Caodaism Services: Not on file     Active Member of Clubs or Organizations: Not on file     Attends Club or Organization Meetings: 1 to 4 times per year     Marital Status:    Intimate Partner Violence: Not on file   Housing Stability: Unknown (10/4/2021)    Received from [facility]    Housing Stability Vital Sign     Unable to Pay for Housing in the Last Year: Not on file     Number of Places Lived in the Last Year: Not on file     Unstable Housing in the Last Year: No       FAMILY HISTORY:  Family History   Problem Relation Name Age of Onset    Multiple myeloma Mother      Heart failure Father         REVIEW OF SYSTEMS:  Constitutional: Negative for fever and chills. Denies anorexia, weight loss.  Eyes: Negative for visual disturbance.   Respiratory: Negative for shortness of breath.    Cardiovascular: Negative for chest pain.   Gastrointestinal: Negative for nausea and vomiting.   Genitourinary: See interval history above.  Skin: Negative for rash.   Neurological: Negative for dizziness and numbness.   Psychiatric/Behavioral: Negative for confusion and decreased concentration.     PHYSICAL EXAM:  Temperature 36.3 °C (97.4 °F), weight 67.1 kg (148 lb).  Constitutional: Patient appears well-developed and well-nourished. No distress.    Head: Normocephalic and atraumatic.    Neck: Normal range of motion.    Cardiovascular: Normal rate.    Pulmonary/Chest: Effort normal. No respiratory distress.   Musculoskeletal: Normal range of  motion.    Neurological: Alert and oriented to person, place, and time.  Psychiatric: Normal mood and affect. Behavior is normal. Thought content normal.      Urine dipstick shows positive for WBC's and positive for leukocytes.  Micro exam: not done.     Assessment:      1. History of UTI  POCT UA Automated manually resulted    Urine culture    Urine culture      2. Hormone replacement therapy        3. Gender dysphoria        4. Bladder spasms            Saurabh Patel is a 65 y.o. female with Emma and gender dysphoria.     We discussed further UTI testing due to her symptoms and UA demonstrating trace amounts of blood and small leukocytes. Urine collected today will be sent off for culture. In the meantime, I will provide a prescription for Pyridium to take as needed.      Plan:   Urine sent for culture.   Prescription for Pyridium 100 mg tablets sent to the patient's pharmacy.    Prescription for estradiol 0.1 mg patch sent to the patient's pharmacy.    Prescription for Hiprex 1 mg tablet sent to the patient's pharmacy.    Follow-up in 1 year.     Radha Olivier MD MPH      Scribe Attestation  By signing my name below, I, Grayson Uribe   attest that this documentation has been prepared under the direction and in the presence of Radha Olivier MD MPH.

## 2025-02-20 ENCOUNTER — APPOINTMENT (OUTPATIENT)
Dept: UROLOGY | Facility: CLINIC | Age: 65
End: 2025-02-20
Payer: COMMERCIAL

## 2025-02-20 VITALS — BODY MASS INDEX: 27.07 KG/M2 | TEMPERATURE: 97.4 F | WEIGHT: 148 LBS

## 2025-02-20 DIAGNOSIS — Z87.440 HISTORY OF UTI: Primary | ICD-10-CM

## 2025-02-20 DIAGNOSIS — N32.89 BLADDER SPASMS: ICD-10-CM

## 2025-02-20 DIAGNOSIS — Z79.890 HORMONE REPLACEMENT THERAPY: ICD-10-CM

## 2025-02-20 DIAGNOSIS — F64.9 GENDER DYSPHORIA: ICD-10-CM

## 2025-02-20 PROCEDURE — 99214 OFFICE O/P EST MOD 30 MIN: CPT | Performed by: OBSTETRICS & GYNECOLOGY

## 2025-02-20 PROCEDURE — 81003 URINALYSIS AUTO W/O SCOPE: CPT | Performed by: OBSTETRICS & GYNECOLOGY

## 2025-02-20 PROCEDURE — 1036F TOBACCO NON-USER: CPT | Performed by: OBSTETRICS & GYNECOLOGY

## 2025-02-20 RX ORDER — ESTRADIOL 0.1 MG/D
1 FILM, EXTENDED RELEASE TRANSDERMAL 2 TIMES WEEKLY
Qty: 24 PATCH | Refills: 3 | Status: SHIPPED | OUTPATIENT
Start: 2025-02-20

## 2025-02-20 RX ORDER — PHENAZOPYRIDINE HYDROCHLORIDE 100 MG/1
100 TABLET, FILM COATED ORAL 3 TIMES DAILY PRN
Qty: 30 TABLET | Refills: 0 | Status: SHIPPED | OUTPATIENT
Start: 2025-02-20

## 2025-02-20 RX ORDER — METHENAMINE HIPPURATE 1000 MG/1
1 TABLET ORAL 2 TIMES DAILY
Qty: 60 TABLET | Refills: 0 | Status: SHIPPED | OUTPATIENT
Start: 2025-02-20 | End: 2025-03-22

## 2025-02-20 ASSESSMENT — PAIN SCALES - GENERAL: PAINLEVEL_OUTOF10: 0-NO PAIN

## 2025-02-21 DIAGNOSIS — Z87.440 HISTORY OF UTI: ICD-10-CM

## 2025-02-21 RX ORDER — CIPROFLOXACIN 500 MG/1
500 TABLET ORAL 2 TIMES DAILY
Qty: 14 TABLET | Refills: 0 | Status: SHIPPED | OUTPATIENT
Start: 2025-02-21 | End: 2025-02-28

## 2025-02-22 LAB — BACTERIA UR CULT: NORMAL

## 2025-02-24 ENCOUNTER — OFFICE VISIT (OUTPATIENT)
Dept: URGENT CARE | Age: 65
End: 2025-02-24
Payer: COMMERCIAL

## 2025-02-24 ENCOUNTER — TELEPHONE (OUTPATIENT)
Dept: PRIMARY CARE | Facility: CLINIC | Age: 65
End: 2025-02-24
Payer: COMMERCIAL

## 2025-02-24 ENCOUNTER — ANCILLARY PROCEDURE (OUTPATIENT)
Dept: URGENT CARE | Age: 65
End: 2025-02-24
Payer: COMMERCIAL

## 2025-02-24 VITALS
OXYGEN SATURATION: 99 % | SYSTOLIC BLOOD PRESSURE: 118 MMHG | RESPIRATION RATE: 14 BRPM | DIASTOLIC BLOOD PRESSURE: 76 MMHG | HEART RATE: 66 BPM | TEMPERATURE: 97.2 F

## 2025-02-24 DIAGNOSIS — M25.562 ACUTE PAIN OF LEFT KNEE: ICD-10-CM

## 2025-02-24 DIAGNOSIS — M71.22 BAKER CYST, LEFT: Primary | ICD-10-CM

## 2025-02-24 PROCEDURE — 73564 X-RAY EXAM KNEE 4 OR MORE: CPT | Mod: LEFT SIDE | Performed by: PHYSICIAN ASSISTANT

## 2025-02-24 ASSESSMENT — ENCOUNTER SYMPTOMS
SHORTNESS OF BREATH: 0
WEAKNESS: 0
ABDOMINAL PAIN: 0
VOMITING: 0
COUGH: 0
FEVER: 0
CONFUSION: 0
FATIGUE: 0
NAUSEA: 0
AGITATION: 0
CHILLS: 0
CHEST TIGHTNESS: 0
ARTHRALGIAS: 1
DIARRHEA: 0
HEADACHES: 0
DIZZINESS: 0
JOINT SWELLING: 0

## 2025-02-24 NOTE — PROGRESS NOTES
Subjective   Patient ID: Saurabh Patel is a 65 y.o. female. They present today with a chief complaint of Knee Pain (Left knee).    History of Present Illness  Pt reports noticed a lump behind left knee and feels knee is tight. She is doing work out everyday. Denies other triggers or hx of chronic knee arthritis. Denies weakness. Not taking any OTC meds to relieve symptoms.       Knee Pain         Past Medical History  Allergies as of 02/24/2025 - Reviewed 02/24/2025   Allergen Reaction Noted    Sulfamethoxazole-trimethoprim GI Upset 12/20/2024    Clindamycin Diarrhea 02/18/2023    Nortriptyline Other 03/07/2023       (Not in a hospital admission)       Past Medical History:   Diagnosis Date    Anxiety     Arthritis        Past Surgical History:   Procedure Laterality Date    EYE SURGERY  Cataract    OTHER SURGICAL HISTORY  07/26/2022    Eye surgery        reports that she has quit smoking. Her smoking use included cigarettes. She has never been exposed to tobacco smoke. She has never used smokeless tobacco. She reports current alcohol use.    Review of Systems  Review of Systems   Constitutional:  Negative for chills, fatigue and fever.   Respiratory:  Negative for cough, chest tightness and shortness of breath.    Cardiovascular:  Negative for chest pain.   Gastrointestinal:  Negative for abdominal pain, diarrhea, nausea and vomiting.   Musculoskeletal:  Positive for arthralgias. Negative for joint swelling.   Skin:  Negative for rash.   Neurological:  Negative for dizziness, weakness and headaches.   Psychiatric/Behavioral:  Negative for agitation and confusion.                                   Objective    Vitals:    02/24/25 0951   BP: 118/76   Pulse: 66   Resp: 14   Temp: 36.2 °C (97.2 °F)   SpO2: 99%     No LMP recorded. Patient is postmenopausal.    Physical Exam  Constitutional:       Appearance: Normal appearance.   HENT:      Head: Normocephalic and atraumatic.      Nose: Nose normal.    Cardiovascular:      Rate and Rhythm: Normal rate and regular rhythm.      Heart sounds: No murmur heard.  Pulmonary:      Effort: Pulmonary effort is normal.      Breath sounds: Normal breath sounds.   Musculoskeletal:         General: Swelling and tenderness present. No signs of injury.      Comments: Soft lump behind left knee, full ROM, intact sensation   Neurological:      Mental Status: She is alert and oriented to person, place, and time.   Psychiatric:         Mood and Affect: Mood normal.         Procedures    Point of Care Test & Imaging Results from this visit  No results found for this visit on 02/24/25.   XR knee left 4+ views    Result Date: 2/24/2025  Interpreted By:  Adolph Giordano, STUDY: XR KNEE LEFT 4+ VIEWS; ;  2/24/2025 9:58 am   INDICATION: Signs/Symptoms:left knee pain.   ,M25.562 Pain in left knee   COMPARISON: None.   ACCESSION NUMBER(S): [id number]   ORDERING CLINICIAN: JING CASON   FINDINGS: Left knee, four views   There is no fracture. There is no dislocation. There are no degenerative changes. There is no lytic or sclerotic lesion. There is no soft tissue abnormality seen. There is no effusion       No acute abnormality in the left knee     MACRO: None   Signed by: Adolph Giordano 2/24/2025 10:03 AM Dictation workstation:   [id number]     Diagnostic study results (if any) were reviewed by Jailene Lemos PA-C.    Assessment/Plan   Allergies, medications, history, and pertinent labs/EKGs/Imaging reviewed by Jailene Lemos PA-C.     Medical Decision Making  X-ray no acute findings  Suspicious for baker cyst based on exam and at this point no signs of superimposed bacterial infection  Recommended conservative management and follow up with PCP    Orders and Diagnoses  Diagnoses and all orders for this visit:  Baker cyst, left  Acute pain of left knee  -     XR knee left 4+ views; Future      Medical Admin Record      Patient disposition: Home    Electronically signed by Jailene Lemos  LAURENCE  11:18 AM

## 2025-02-24 NOTE — TELEPHONE ENCOUNTER
Patient went to urgent care and was diagnosed with a bakers cyst behind left knee. Asking if she needs to follow up with you on this issue .

## 2025-02-28 ENCOUNTER — HOSPITAL ENCOUNTER (OUTPATIENT)
Dept: RADIOLOGY | Facility: EXTERNAL LOCATION | Age: 65
Discharge: HOME | End: 2025-02-28

## 2025-02-28 ENCOUNTER — OFFICE VISIT (OUTPATIENT)
Dept: ORTHOPEDIC SURGERY | Facility: CLINIC | Age: 65
End: 2025-02-28
Payer: COMMERCIAL

## 2025-02-28 DIAGNOSIS — M71.22 BAKER'S CYST OF KNEE, LEFT: Primary | ICD-10-CM

## 2025-02-28 PROCEDURE — 99214 OFFICE O/P EST MOD 30 MIN: CPT | Performed by: EMERGENCY MEDICINE

## 2025-02-28 RX ORDER — LIDOCAINE HYDROCHLORIDE 10 MG/ML
2 INJECTION, SOLUTION INFILTRATION; PERINEURAL
Status: COMPLETED | OUTPATIENT
Start: 2025-02-28 | End: 2025-02-28

## 2025-02-28 RX ORDER — TRIAMCINOLONE ACETONIDE 40 MG/ML
40 INJECTION, SUSPENSION INTRA-ARTICULAR; INTRAMUSCULAR
Status: COMPLETED | OUTPATIENT
Start: 2025-02-28 | End: 2025-02-28

## 2025-02-28 RX ADMIN — LIDOCAINE HYDROCHLORIDE 2 ML: 10 INJECTION, SOLUTION INFILTRATION; PERINEURAL at 11:16

## 2025-02-28 RX ADMIN — TRIAMCINOLONE ACETONIDE 40 MG: 40 INJECTION, SUSPENSION INTRA-ARTICULAR; INTRAMUSCULAR at 11:16

## 2025-02-28 NOTE — PROGRESS NOTES
Subjective    Patient ID: Saurabh Patel is a 65 y.o. female.    Chief Complaint: Pain of the Left Knee (NPV L KNEE PAIN X WEEKS/PT NOTES BUMP POSTERIORLY/PT STATES THEY WERE USING A CABLE MACHINE WHEN THEY MAY HAVE A)     Last Surgery: No surgery found  Last Surgery Date: No surgery found    Patient is a very pleasant 65-year-old female coming in with some swelling behind the left knee that has been present for the past few weeks.  She is very active and was working out with a cable machine when she first noted a little bit of discomfort in the posterior aspect of the left knee.  She went to urgent care and had x-rays on 2/24/2025.  She was told that she might have a Baker's cyst.  She really does not have much pain but does have stiffness especially with knee flexion and extreme extension.  She has been taking ibuprofen occasionally and using ice.  She would like to avoid surgery if at all possible.  She denies any traumatic events or known injuries.         Objective   Right Knee Exam   Right knee exam is normal.      Left Knee Exam     Muscle Strength   The patient has normal left knee strength.    Tenderness   The patient is experiencing no tenderness (No point tenderness to palpation but the patient does have a little bit of discomfort with an area of swelling over the posterior aspect of the left knee consistent with a Baker's cyst.).     Range of Motion   Extension:  normal   Flexion:  abnormal     Tests   Monty:  Medial - negative Lateral - negative  Varus: negative Valgus: negative  Drawer:  Anterior - negative     Posterior - negative    Other   Erythema: absent  Sensation: normal  Swelling: mild  Effusion: no effusion present            Image Results:  Point of Care Ultrasound  These images are not reportable by radiology and will not be interpreted   by  Radiologists.    X-rays of the left knee were reviewed and interpreted by me on 2/28/2025 and did not reveal any evidence of acute injury  or fracture.  Some very minimal degenerative changes.    Patient ID: Saurabh Patel is a 65 y.o. female.    L Inj/Asp (L Mcmillan's cyst asp and inj) on 2/28/2025 11:16 AM  Indications: pain  Details: 18 G needle, ultrasound-guided posterior approach  Medications: 2 mL lidocaine 10 mg/mL (1 %); 40 mg triamcinolone acetonide 40 mg/mL  Aspirate: 10 mL clear and yellow  Outcome: tolerated well, no immediate complications  Procedure, treatment alternatives, risks and benefits explained, specific risks discussed. Consent was given by the patient. Immediately prior to procedure a time out was called to verify the correct patient, procedure, equipment, support staff and site/side marked as required. Patient was prepped and draped in the usual sterile fashion.           Assessment/Plan   Encounter Diagnoses:  Baker's cyst of knee, left    Orders Placed This Encounter    L Inj/Asp    Point of Care Ultrasound     No follow-ups on file.    We discussed her treatment options and agreed to perform a left Baker's cyst aspiration and injection under ultrasound guidance.  The patient tolerated the procedure well without any complications and activity modifications were reviewed.  She is going to follow-up with me as needed moving forward.    ** Please excuse any errors in grammar or translation related to this dictation. Voice recognition software was utilized to prepare this document. **       Daniel Marquis MD  [facility]

## 2025-03-04 ENCOUNTER — OFFICE (OUTPATIENT)
Dept: URBAN - METROPOLITAN AREA CLINIC 26 | Facility: CLINIC | Age: 65
End: 2025-03-04
Payer: COMMERCIAL

## 2025-03-04 VITALS
SYSTOLIC BLOOD PRESSURE: 147 MMHG | HEART RATE: 56 BPM | WEIGHT: 145 LBS | DIASTOLIC BLOOD PRESSURE: 72 MMHG | TEMPERATURE: 98.1 F | DIASTOLIC BLOOD PRESSURE: 70 MMHG | SYSTOLIC BLOOD PRESSURE: 149 MMHG | HEIGHT: 65 IN

## 2025-03-04 DIAGNOSIS — K57.30 DIVERTICULOSIS OF LARGE INTESTINE WITHOUT PERFORATION OR ABS: ICD-10-CM

## 2025-03-04 DIAGNOSIS — K21.9 GASTRO-ESOPHAGEAL REFLUX DISEASE WITHOUT ESOPHAGITIS: ICD-10-CM

## 2025-03-04 DIAGNOSIS — K58.9 IRRITABLE BOWEL SYNDROME, UNSPECIFIED: ICD-10-CM

## 2025-03-04 PROCEDURE — 99213 OFFICE O/P EST LOW 20 MIN: CPT | Performed by: NURSE PRACTITIONER

## 2025-03-18 DIAGNOSIS — M47.812 CERVICAL SPONDYLOSIS WITHOUT MYELOPATHY: ICD-10-CM

## 2025-03-18 DIAGNOSIS — J45.20 MILD INTERMITTENT EXTRINSIC ASTHMA WITHOUT COMPLICATION (HHS-HCC): ICD-10-CM

## 2025-03-18 RX ORDER — OXYCODONE AND ACETAMINOPHEN 10; 325 MG/1; MG/1
0.5 TABLET ORAL EVERY 6 HOURS PRN
Qty: 60 TABLET | Refills: 0 | Status: SHIPPED | OUTPATIENT
Start: 2025-03-18

## 2025-03-18 RX ORDER — ALBUTEROL SULFATE 90 UG/1
INHALANT RESPIRATORY (INHALATION)
Qty: 18 G | Refills: 1 | Status: SHIPPED | OUTPATIENT
Start: 2025-03-18

## 2025-03-18 NOTE — TELEPHONE ENCOUNTER
Patient called and needs a refill on Oxycodone 10-325mg sent to [facility] [place].    She also needs Albuterol 90mcg sent to [facility]      Also patient states she sent a message previously about the Lisinopril she is taking. She feels this medication is giving her a dry cough.      Saurabh Patel  [phone]

## 2025-03-20 ENCOUNTER — TELEPHONE (OUTPATIENT)
Dept: PRIMARY CARE | Facility: CLINIC | Age: 65
End: 2025-03-20
Payer: COMMERCIAL

## 2025-03-20 NOTE — TELEPHONE ENCOUNTER
Patient calling saying she has UTI symptoms and would like to come in for a urine test.   I did let her know that all doctors are out and therefore could not write and order but she is asking to come in for a urine test anyway.   Sent to Maureen Wiley

## 2025-03-20 NOTE — TELEPHONE ENCOUNTER
"Spoke with Saurabh and she stated:    She thinks she has a UTI but not sure because her IBS sometimes mimics UTI symptoms  She is having urinary frequency and some lower abdominal cramping/discomfort  She would like to come in office and be checked for UTI  No fever, no blood in urine, no burning/painful urination  Isn't taking anything otc at this time    Per [facility], she was advised to go to UC to be checked out for UTI as there isn't a physician in office at this time and based off of her symptoms  She stated \"she thinks it could be IBS\", she was then advised to still be checked out at UC,   She was also advised to drink plenty of fluids and cranberry supplements/drink, she stated ' she was and would take pyridium to help and see if the symptoms subside\"    "

## 2025-04-04 ENCOUNTER — APPOINTMENT (OUTPATIENT)
Dept: UROLOGY | Facility: CLINIC | Age: 65
End: 2025-04-04
Payer: COMMERCIAL

## 2025-04-09 ENCOUNTER — TELEPHONE (OUTPATIENT)
Dept: PRIMARY CARE | Facility: CLINIC | Age: 65
End: 2025-04-09
Payer: COMMERCIAL

## 2025-04-09 NOTE — TELEPHONE ENCOUNTER
Patient called to say she had her labs done.   She is asking if you can send her a MyC message when you get the results if they are fine, or schedule a virtual appointment if you need to talk    [place] [phone]

## 2025-04-10 LAB
ALBUMIN SERPL-MCNC: 4.3 G/DL (ref 3.6–5.1)
ALP SERPL-CCNC: 55 U/L (ref 37–153)
ALT SERPL-CCNC: 10 U/L (ref 6–29)
ANION GAP SERPL CALCULATED.4IONS-SCNC: 10 MMOL/L (CALC) (ref 7–17)
AST SERPL-CCNC: 14 U/L (ref 10–35)
BILIRUB SERPL-MCNC: 0.6 MG/DL (ref 0.2–1.2)
BUN SERPL-MCNC: 15 MG/DL (ref 7–25)
CALCIUM SERPL-MCNC: 9.2 MG/DL (ref 8.6–10.4)
CHLORIDE SERPL-SCNC: 101 MMOL/L (ref 98–110)
CHOLEST SERPL-MCNC: 188 MG/DL
CHOLEST/HDLC SERPL: 2.3 (CALC)
CO2 SERPL-SCNC: 25 MMOL/L (ref 20–32)
CREAT SERPL-MCNC: 1.01 MG/DL (ref 0.5–1.05)
CRP SERPL HS-MCNC: 1.6 MG/L
EGFRCR SERPLBLD CKD-EPI 2021: 62 ML/MIN/1.73M2
EST. AVERAGE GLUCOSE BLD GHB EST-MCNC: 123 MG/DL
EST. AVERAGE GLUCOSE BLD GHB EST-SCNC: 6.8 MMOL/L
GLUCOSE SERPL-MCNC: 107 MG/DL (ref 65–99)
HBA1C MFR BLD: 5.9 % OF TOTAL HGB
HDLC SERPL-MCNC: 82 MG/DL
LDLC SERPL CALC-MCNC: 89 MG/DL (CALC)
LDLC SERPL DIRECT ASSAY-MCNC: 96 MG/DL
LPA SERPL-SCNC: 18 NMOL/L
NONHDLC SERPL-MCNC: 106 MG/DL (CALC)
POTASSIUM SERPL-SCNC: 5 MMOL/L (ref 3.5–5.3)
PROT SERPL-MCNC: 6.9 G/DL (ref 6.1–8.1)
SODIUM SERPL-SCNC: 136 MMOL/L (ref 135–146)
TRIGL SERPL-MCNC: 78 MG/DL

## 2025-04-11 ENCOUNTER — APPOINTMENT (OUTPATIENT)
Dept: ORTHOPEDIC SURGERY | Facility: CLINIC | Age: 65
End: 2025-04-11
Payer: COMMERCIAL

## 2025-04-11 DIAGNOSIS — M71.22 BAKER'S CYST OF KNEE, LEFT: Primary | ICD-10-CM

## 2025-04-11 PROCEDURE — 99213 OFFICE O/P EST LOW 20 MIN: CPT | Performed by: EMERGENCY MEDICINE

## 2025-04-11 NOTE — PROGRESS NOTES
Subjective    Patient ID: Saurabh Patel is a 65 y.o. female.    Chief Complaint: Follow-up of the Left Knee (FUV L KNEE BAKERS CYST ASP + INJ LAST DONE 2/28/25/POSSIBLE RECURRENCE A FEW WEEKS AGO, BUT PT NOTES IT HAS REGRESSED TODAY/STILL NOTES SOME ANTERIOR KNEE PAIN)     Last Surgery: No surgery found  Last Surgery Date: No surgery found    Patient is a very pleasant 65-year-old female coming in with some swelling behind the left knee that has been present for the past few weeks.  She is very active and was working out with a cable machine when she first noted a little bit of discomfort in the posterior aspect of the left knee.  She went to urgent care and had x-rays on 2/24/2025.  She was told that she might have a Baker's cyst.  She really does not have much pain but does have stiffness especially with knee flexion and extreme extension.  She has been taking ibuprofen occasionally and using ice.  She would like to avoid surgery if at all possible.  She denies any traumatic events or known injuries. We discussed her treatment options and agreed to perform a left Baker's cyst aspiration and injection under ultrasound guidance.  The patient tolerated the procedure well without any complications and activity modifications were reviewed.  She is going to follow-up with me as needed moving forward.    Update on 4/11/2025.  Patient is coming back in for some acute on chronic discomfort in her left knee.  She states that the Baker's cyst returned so she made today's appointment but it seems like it spontaneously resolved.  She has minimal symptoms but is having a little bit of discomfort over the anterior aspect of left knee that radiates towards the lateral joint compartment.  She is working out and lifts weights, specifically her lower extremities, twice weekly.  She is a little sore afterwards.  She wants to hold off on any potential surgical intervention.  No trauma.  No other complaints here  today.        Objective   Right Knee Exam   Right knee exam is normal.      Left Knee Exam     Muscle Strength   The patient has normal left knee strength.    Tenderness   The patient is experiencing tenderness in the lateral joint line (Mild discomfort over the lateral meniscus/joint line).    Range of Motion   Extension:  normal   Flexion:  normal     Tests   Monty:  Medial - negative Lateral - negative  Varus: negative Valgus: negative  Drawer:  Anterior - negative     Posterior - negative    Other   Erythema: absent  Sensation: normal  Swelling: none    Comments:  Tension against resistance is intact            Image Results:  Point of Care Ultrasound  These images are not reportable by radiology and will not be interpreted   by  Radiologists.    No new imaging today    Patient ID: Saurabh Patel is a 65 y.o. female.    Procedures    Assessment/Plan   Encounter Diagnoses:  Baker's cyst of knee, left    No orders of the defined types were placed in this encounter.    No follow-ups on file.    Patient is following up with me today because of a Baker's cyst in her left knee that has spontaneously resolved.  No indication for acute intervention or additional imaging here today.  She also has a little bit of anterior lateral knee discomfort.  Her x-rays look wonderful and she has maintained really good joint spacing.  She still could have some degenerative changes and may even have a lateral meniscus tear.  We talked about all of this and I even offered her an MRI and possible surgical referral for a meniscectomy but she declined.  I offered her a diagnostic and potentially therapeutic cortisone injection today and we also discussed about potentially doing gels or PRP injections in the future.  She is going to think about all of this and will follow-up with me as needed moving forward.    Patient came back into the office and requested that we do order the MRI.  Order placed.  I will follow-up with her  afterwards.    ** Please excuse any errors in grammar or translation related to this dictation. Voice recognition software was utilized to prepare this document. **       Daniel Marquis MD  [facility]

## 2025-04-11 NOTE — TELEPHONE ENCOUNTER
Patient stopped by the office today. She states she has not heard from anyone about her phone message she left 2 days ago.  She would like to review the results of the lab work.. Does she need to make an virtual appointment? Please call her.    Saurabh Patel  [phone]

## 2025-04-14 DIAGNOSIS — R73.03 PREDIABETES: Primary | ICD-10-CM

## 2025-05-05 ENCOUNTER — OFFICE (OUTPATIENT)
Dept: URBAN - METROPOLITAN AREA CLINIC 26 | Facility: CLINIC | Age: 65
End: 2025-05-05
Payer: COMMERCIAL

## 2025-05-05 VITALS
TEMPERATURE: 98.3 F | SYSTOLIC BLOOD PRESSURE: 133 MMHG | WEIGHT: 140 LBS | HEIGHT: 65 IN | DIASTOLIC BLOOD PRESSURE: 79 MMHG | HEART RATE: 69 BPM

## 2025-05-05 DIAGNOSIS — K58.9 IRRITABLE BOWEL SYNDROME, UNSPECIFIED: ICD-10-CM

## 2025-05-05 DIAGNOSIS — R10.13 EPIGASTRIC PAIN: ICD-10-CM

## 2025-05-05 PROCEDURE — 99213 OFFICE O/P EST LOW 20 MIN: CPT | Performed by: NURSE PRACTITIONER

## 2025-05-27 ENCOUNTER — APPOINTMENT (OUTPATIENT)
Dept: SURGERY | Facility: CLINIC | Age: 65
End: 2025-05-27
Payer: COMMERCIAL

## 2025-05-27 ENCOUNTER — HOSPITAL ENCOUNTER (OUTPATIENT)
Dept: RADIOLOGY | Facility: EXTERNAL LOCATION | Age: 65
Discharge: HOME | End: 2025-05-27

## 2025-05-27 VITALS
BODY MASS INDEX: 23.49 KG/M2 | TEMPERATURE: 97.8 F | DIASTOLIC BLOOD PRESSURE: 77 MMHG | HEART RATE: 56 BPM | RESPIRATION RATE: 18 BRPM | WEIGHT: 141 LBS | HEIGHT: 65 IN | OXYGEN SATURATION: 97 % | SYSTOLIC BLOOD PRESSURE: 128 MMHG

## 2025-05-27 DIAGNOSIS — K40.20 NON-RECURRENT BILATERAL INGUINAL HERNIA WITHOUT OBSTRUCTION OR GANGRENE: Primary | ICD-10-CM

## 2025-05-27 PROCEDURE — 3008F BODY MASS INDEX DOCD: CPT | Performed by: SURGERY

## 2025-05-27 PROCEDURE — 99214 OFFICE O/P EST MOD 30 MIN: CPT | Performed by: SURGERY

## 2025-05-27 NOTE — H&P
History Of Present Illness :  Saurabh Patel is a 65 y.o. female known to me from a previous consultation on 10/5/2022, who presents for bilateral inguinal evaluation.  At that time, no evidence of inguinal or femoral hernia was identified on examination bilaterally.    Recent MRI of the pelvis performed at [facility] revealed the following report dictated 4/23/2025:  Impression:     1. Status post removal of the penis and testes.   2. Bilateral fat-containing inguinal hernias.   3. Diverticulosis coli without MRI evidence of diverticulitis in the sigmoid colon.   4. No mass or inflammatory process identified in the pelvis. No lymphadenopathy.   5. Unremarkable hips.     The patient was recommended by Dr. Stacy from [facility] urology to obtain a general surgical opinion.  The patient has no inguinal pain mass lump or bulge bilaterally.    Of note as well, CT of the abdomen pelvis with IV contrast at [facility] dated 12/20/2024 revealed only diverticulosis.  There was no note of bilateral inguinal hernias.    The patient is status post gender affirmation surgery:   -Started HRT in June 2015  -Orchiectomy with Dr Davis Feb 2018  -PPT vaginoplasty at  Sept 2022  -Revision for width June 2023 (Dr Olivier)    10/5/2022:  This is a 62-year-old male to female/transgender female patient of Dr. Cedeño self-referred to me for possible bilateral inguinal hernias.     The patient recently underwent operative therapy for gender dysphoria. That operative note per Dr. Quinonez's dated 9/14/2022 and that was reviewed. There were 14 different procedures with that operation.     Prior to this operation, the patient had a CT scan of the abdomen and pelvis with contrast on 8/29/2022. I reviewed the report. The report reveals no acute abdominal pelvic process. There is a suggestion of fat-containing bilateral inguinal hernias on the report, along with his colonic diverticulosis.     The patient notes some bilateral groin achiness for  about 6 to 8 weeks. She feels a fullness in the groin region but not a bulge per se. She does have some chronic GI symptoms from IBS.     Patient is single. She lives in [place]. 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          Past Medical History   Medical History[1]     Surgical History    Surgical History[2]     Allergies   RX Allergies[3]     Home Meds  Current Outpatient Medications   Medication Instructions    albuterol 90 mcg/actuation inhaler One or two puffs every 4 hours as needed for wheezing or shortness of breath.    alpha lipoic acid 300 mg capsule Take by mouth.    b complex vitamins capsule Take by mouth.    estradiol (Estrace) 0.01 % (0.1 mg/gram) vaginal cream APPLY A PEA-SIZED AMOUNT   VAGINALLY 3 TIMES A WEEK ATBEDTIME    estradiol (Vivelle-DOT) 0.1 mg/24 hr patch 1 patch, transdermal, 2 times weekly    lisinopril 20 mg, oral, Daily    multivitamin tablet 1 tablet, Daily    oxyCODONE-acetaminophen (Percocet)  mg tablet 0.5 tablets, oral, Every 6 hours PRN    pantoprazole (PROTONIX) 40 mg, oral, Daily before breakfast    phenazopyridine (Pyridium) 100 mg tablet Take 1 tablet (100mg) by mouth three times daily after meals as needed for urinary burning. Will turn urine orange/red in color    phenazopyridine (PYRIDIUM) 100 mg, oral, 3 times daily PRN    progesterone (PROMETRIUM) 100 mg, oral, Nightly    testosterone 20.25 mg/1.25 gram (1.62 %) gel in metered-dose pump Apply one pump once weekly    timolol (Timoptic) 0.5 % ophthalmic solution 1 drop, Nightly        Family History    Family History[4]     Social History  Social History[5]     Review Of Systems    General: Not Present- Obesity, Cancer, HIV, MRSA, Recent Cold/Flu, Tired During the Day and VRE.  HEENT: Not Present- Migraine, Cataracts, Glaucoma, Macular Degeneration and Retinal Detachment.  Respiratory:Not Present- Asthma, Chronic Cough, Difficulty Breathing on Exertion, Difficulty Breathing at Rest, Emphysema, Frequent  Bronchitis, Home CPAP/BiPAP, Home Oxygen, Pulmonary Embolus, Pneumonia/TB, Sleep Apnea and Snoring.  Cardiovascular: Not Present- Chest Pain, Congestive Heart Failure, Heart Attack, Coronary Artery Disease, Heart Stent, High Cholesterol/Lipids, Internal Defibrillator, Irregular Heart Beat, Mitral Valve Prolapse, Murmur, Pacemaker and Peripheral Vascular Disease.  Gastrointestinal: Not Present- Heartburn, Hepatitis, Hiatal Hernia, Jaundice, Reflux, Stomach Ulcer  Genitourinary: Not Present- Kidney Failure, Kidney Stones, Dialysis and Urinary Tract Infection.  Musculoskeletal: Not Present- Arthritis, Back Pain and Fibromyalgia.  Neurological: Not Present- Headaches, Numbness, Tingling, Seizures, Stroke,  Shunt and Weakness.  Psychiatric: Not Present- Bipolar, Depression and Panic Attacks.  Endocrine: Not Present- Diabetes, Hyperthyroidism, Hypothyroidism and Low Blood Sugar.  Hematology: Not Present- Abnormal Bleeding, Anemia and Blood Clots.     Vitals  There were no vitals taken for this visit.     Physical Exam   Abdominal Physical Exam     General  General Appearance - Not in acute distress. Well-developed and well-nourished. Alert and oriented times 3.     Chest and Lung Exam  Auscultation:  Breath sounds: - Normal. Clear and equal bilaterally.  Adventitious sounds: - No Adventitious sounds.     Cardiovascular  Auscultation: Rate and Rhythm - Regular. Heart Sounds - Normal heart sounds. No murmurs.     Abdomen  Inspection: Inspection of the abdomen reveals - No Visible peristalsis, No Abnormal pulsations, No Paradoxical  movements and No Hernias.  Palpation/Percussion: Palpation and Percussion of the abdomen reveal - Non Tender, No Rebound tenderness, NoRigidity (guarding) and No Palpable abdominal masses.  Liver: - Normal.  Spleen: - Normal.  Auscultation: Auscultation of the abdomen reveals - Bowel sounds normal and No Abdominal bruits.  Surgical scars: Multiple laparoscopic incisions which are healing well    "  Inguinal and External Genitalia     My medical assistant, Bailee, assisted and chaperoned     Inguinal and External Genitalia    The patient was examined supine, and standing, with and without Valsalva. There is evidence of a very small nontender easily reducible bilateral inguinal hernias.  There is some minimal prolapsing fatty tissue at the external ring bilaterally.  There is no femoral hernia bilaterally. There is no evidence of inguinal or femoral lymphadenopathy bilaterally.     Reconstructed female genitalia     Rectal  Anorectal Exam: not examined.    Assessment/Plan   Vencentia has very small reducible asymptomatic bilateral inguinal hernias.  The prolapsing tissue is likely minimal fatty tissue only.    Recommendations:    As the hernias are small and asymptomatic, expectant management is reasonable and the patient agrees.    Will have the images from the recent MRI uploaded to the  PACS system and review    The patient will return to see me in 4-6 months for recheck; if the hernias enlarge or become symptomatic in the meantime, the patient will return sooner           [1]   Past Medical History:  Diagnosis Date    Anxiety     Arthritis    [2]   Past Surgical History:  Procedure Laterality Date    EYE SURGERY  Cataract    OTHER SURGICAL HISTORY  07/26/2022    Eye surgery   [3]   Allergies  Allergen Reactions    Sulfamethoxazole-Trimethoprim GI Upset     \"Severe diarrhea\"    Clindamycin Diarrhea    Nortriptyline Other   [4]   Family History  Problem Relation Name Age of Onset    Multiple myeloma Mother      Heart failure Father     [5]   Social History  Tobacco Use    Smoking status: Former     Types: Cigarettes     Passive exposure: Never    Smokeless tobacco: Never   Substance Use Topics    Alcohol use: Yes     "

## 2025-06-15 ENCOUNTER — ANCILLARY PROCEDURE (OUTPATIENT)
Dept: URGENT CARE | Age: 65
End: 2025-06-15
Payer: COMMERCIAL

## 2025-06-15 ENCOUNTER — OFFICE VISIT (OUTPATIENT)
Dept: URGENT CARE | Age: 65
End: 2025-06-15
Payer: COMMERCIAL

## 2025-06-15 VITALS
DIASTOLIC BLOOD PRESSURE: 80 MMHG | BODY MASS INDEX: 23.3 KG/M2 | WEIGHT: 140 LBS | TEMPERATURE: 97.7 F | SYSTOLIC BLOOD PRESSURE: 150 MMHG | OXYGEN SATURATION: 98 % | RESPIRATION RATE: 17 BRPM | HEART RATE: 57 BPM

## 2025-06-15 DIAGNOSIS — S89.91XA RIGHT KNEE INJURY, INITIAL ENCOUNTER: ICD-10-CM

## 2025-06-15 DIAGNOSIS — S89.91XA RIGHT KNEE INJURY, INITIAL ENCOUNTER: Primary | ICD-10-CM

## 2025-06-15 PROCEDURE — 1036F TOBACCO NON-USER: CPT | Performed by: PHYSICIAN ASSISTANT

## 2025-06-15 PROCEDURE — 73564 X-RAY EXAM KNEE 4 OR MORE: CPT | Mod: RIGHT SIDE | Performed by: PHYSICIAN ASSISTANT

## 2025-06-15 PROCEDURE — 99214 OFFICE O/P EST MOD 30 MIN: CPT | Performed by: PHYSICIAN ASSISTANT

## 2025-06-15 NOTE — PROGRESS NOTES
Subjective   Patient ID: Saurabh Patel is a 65 y.o. female. They present today with a chief complaint of R knee injury    Patient disposition: Home    HISTORY OF PRESENT ILLNESS:    This is a 65 year old female presenting for injury to the R knee yesterday. Twisted knee at home accidentally when leg got stuck on a coffee table. Denies fall or injury to other body part. Pain was minor at first but worsened over the rest of the day, now 7/10 with ambulation, located on medial knee. Denies swelling, bruising.     Past Medical History  Allergies as of 06/15/2025 - Reviewed 06/15/2025   Allergen Reaction Noted    Sulfamethoxazole-trimethoprim GI Upset 12/20/2024    Clindamycin Diarrhea 02/18/2023    Nortriptyline Other 03/07/2023       Prescriptions Prior to Admission[1]     Medical History[2]    Surgical History[3]     reports that she has quit smoking. Her smoking use included cigarettes. She has never been exposed to tobacco smoke. She has never used smokeless tobacco. She reports current alcohol use.    Review of Systems    Negative except as documented in the History of Present Illness.                             Objective    Vitals:    06/15/25 0918   BP: 150/80   Pulse: 57   Resp: 17   Temp: 36.5 °C (97.7 °F)   SpO2: 98%   Weight: 63.5 kg (140 lb)     No LMP recorded. Patient is postmenopausal.      PHYSICAL EXAMINATION:    CONSTITUTIONAL: nontoxic, ambulatory, well-appearing    EYES:  No scleral icterus or orbital trauma noted. No conjunctival injection. PERRLA. EOMI b/l. No nystagmus.    HEENT:  Head and face are unremarkable and atraumatic. Mucous membranes moist. Nares are patent without copious rhinorrhea.  No lymphadenopathy.    CARDIOVASCULAR:  RRR, no m/r/g. Nl S1/S2.     RLE    *Distal pulses intact, capillary refill 1 second in distal digits.    LUNGS:  CTAB, no r/r/w. No dullness to percussion.    ABDOMEN:  Nonobese, nonscaphoid..    SKIN: Normal skin exam of RLE without swelling wounds or  ecchymosis    MUSCULOSKELETAL:     RLE    No laxity, negative Apley    * ROM is FROM wo pain    * Tenderness is present and mild at medial anterior joint line    * Soft tissue swelling is absent    Gait is minimally antalgic         NEURO:     RLE    * Motor function is distally intact    * Sensation is distally intact         PSYCH: Appropriate mood and affect.         ---------------------------------------------------------------         MDM:  XR interpreted by me independently showed no effusion, fracture, nor degenerative changes. Awaiting final radiologist impression. Discussed RICE tx and NSAIDs for treatment. She has orthopedist for fu PRN if unimproved. She had a compression sleeve at home and will use that PRN.        Procedures    Diagnostic study results (if any) were reviewed by Goran Rae PA-C.    No results found for this visit on 06/15/25.     Assessment/Plan   Allergies, medications, history, and pertinent labs/EKGs/Imaging reviewed by Goran Rae PA-C.     Orders and Diagnoses  There are no diagnoses linked to this encounter.    Medical Admin Record      Follow Up Instructions  No follow-ups on file.    Electronically signed by Goran Rae PA-C  9:24 AM         [1] (Not in a hospital admission)  [2]   Past Medical History:  Diagnosis Date    Anxiety     Arthritis    [3]   Past Surgical History:  Procedure Laterality Date    EYE SURGERY  Cataract    OTHER SURGICAL HISTORY  07/26/2022    Eye surgery

## 2025-06-15 NOTE — PATIENT INSTRUCTIONS
Do RICE therapy as discussed.    Use ibuprofen as needed for pain.    Follow up with your orthopedic doctor as needed if not improving after 1 week of healing time.

## 2025-06-16 ENCOUNTER — TELEPHONE (OUTPATIENT)
Dept: URGENT CARE | Age: 65
End: 2025-06-16

## 2025-06-18 ENCOUNTER — APPOINTMENT (OUTPATIENT)
Dept: ORTHOPEDIC SURGERY | Age: 65
End: 2025-06-18
Payer: COMMERCIAL

## 2025-07-01 ENCOUNTER — TELEPHONE (OUTPATIENT)
Dept: PRIMARY CARE | Facility: CLINIC | Age: 65
End: 2025-07-01
Payer: COMMERCIAL

## 2025-07-01 DIAGNOSIS — I10 BENIGN ESSENTIAL HTN: Primary | ICD-10-CM

## 2025-07-01 NOTE — TELEPHONE ENCOUNTER
Patient calling,has questions about her cardiac health, worried about some issues she is experiencing. Asked for a virtual or in office visit this week, but no availability.  Can she be further evaluated and forward to doctor.  [phone]

## 2025-07-01 NOTE — TELEPHONE ENCOUNTER
See other message from today. NOTE: I DID NOT ORDER C REACTIVE PROTIEN SINCE SHE JUST HAD THIS IN APRIL.

## 2025-07-23 ENCOUNTER — TELEPHONE (OUTPATIENT)
Dept: PRIMARY CARE | Facility: CLINIC | Age: 65
End: 2025-07-23
Payer: COMMERCIAL

## 2025-07-23 DIAGNOSIS — F64.9 GENDER DYSPHORIA: ICD-10-CM

## 2025-07-23 NOTE — TELEPHONE ENCOUNTER
Patient sent message and left voice mail  Would like to do these labs tomorrow (Thursday)        Hello,  I’m going in to get the bloodwork done this Thursday 7/24 at the [facility], can Dr. bruce add all additional orders for yearly physical including PSA so she has everything for my yearly physical in in I can get that done also October? Also, she had mentioned breaking my lisinopril in a half. The 10 mg and send it to [facility] and I do need a refill of oxycodone sent to [facility] [place]. If I can get orders for mammogram and bone density. I can get those done before October also.Thank you!

## 2025-07-24 DIAGNOSIS — Z12.5 SCREENING FOR PROSTATE CANCER: ICD-10-CM

## 2025-07-24 DIAGNOSIS — I10 BENIGN ESSENTIAL HTN: Primary | ICD-10-CM

## 2025-07-24 DIAGNOSIS — E78.5 ELEVATED LIPIDS: ICD-10-CM

## 2025-07-24 DIAGNOSIS — Z51.81 MEDICATION MONITORING ENCOUNTER: ICD-10-CM

## 2025-07-24 RX ORDER — LISINOPRIL 10 MG/1
10 TABLET ORAL DAILY
Qty: 100 TABLET | Refills: 3 | Status: SHIPPED | OUTPATIENT
Start: 2025-07-24 | End: 2025-07-29 | Stop reason: SDUPTHER

## 2025-07-24 NOTE — TELEPHONE ENCOUNTER
She had the labs done this morning.   Can these new orders be added on to the blood already drawn or does she have to come back?

## 2025-07-24 NOTE — TELEPHONE ENCOUNTER
Patient just following up on script request and order updates. Asking to be called when all is done.

## 2025-07-25 LAB
EST. AVERAGE GLUCOSE BLD GHB EST-MCNC: 117 MG/DL
EST. AVERAGE GLUCOSE BLD GHB EST-SCNC: 6.5 MMOL/L
HBA1C MFR BLD: 5.7 %

## 2025-07-25 NOTE — TELEPHONE ENCOUNTER
Patient calling again    2 things    Does she need to come in for the refill on the Oxycodone-acetaminophen? She needs a refill    Can the lab orders from yesterday be added to the blood she had drawn already yesterday morning?    Vincentia

## 2025-07-28 ENCOUNTER — TELEPHONE (OUTPATIENT)
Dept: PRIMARY CARE | Facility: CLINIC | Age: 65
End: 2025-07-28
Payer: COMMERCIAL

## 2025-07-28 NOTE — TELEPHONE ENCOUNTER
Patient is having ear pain, thinks it might be related to a wasp sting.  She is asking for a virtual appointment because she is at work  Can I put her in the 1:30 spot for a virtual?

## 2025-07-29 ENCOUNTER — OFFICE VISIT (OUTPATIENT)
Dept: PRIMARY CARE | Facility: CLINIC | Age: 65
End: 2025-07-29
Payer: COMMERCIAL

## 2025-07-29 VITALS
HEART RATE: 62 BPM | WEIGHT: 139 LBS | RESPIRATION RATE: 16 BRPM | SYSTOLIC BLOOD PRESSURE: 116 MMHG | HEIGHT: 65 IN | BODY MASS INDEX: 23.16 KG/M2 | DIASTOLIC BLOOD PRESSURE: 74 MMHG

## 2025-07-29 DIAGNOSIS — H60.502 ACUTE OTITIS EXTERNA OF LEFT EAR, UNSPECIFIED TYPE: ICD-10-CM

## 2025-07-29 DIAGNOSIS — E78.5 ELEVATED LIPIDS: ICD-10-CM

## 2025-07-29 DIAGNOSIS — R73.09 ELEVATED GLUCOSE: ICD-10-CM

## 2025-07-29 DIAGNOSIS — M47.812 CERVICAL SPONDYLOSIS WITHOUT MYELOPATHY: ICD-10-CM

## 2025-07-29 DIAGNOSIS — Z12.5 SCREENING FOR PROSTATE CANCER: ICD-10-CM

## 2025-07-29 DIAGNOSIS — Z51.81 MEDICATION MONITORING ENCOUNTER: ICD-10-CM

## 2025-07-29 DIAGNOSIS — T63.481D INSECT STINGS, ACCIDENTAL OR UNINTENTIONAL, SUBSEQUENT ENCOUNTER: Primary | ICD-10-CM

## 2025-07-29 DIAGNOSIS — Z12.31 VISIT FOR SCREENING MAMMOGRAM: ICD-10-CM

## 2025-07-29 DIAGNOSIS — I10 BENIGN ESSENTIAL HTN: ICD-10-CM

## 2025-07-29 PROBLEM — T63.481A INSECT STING: Status: ACTIVE | Noted: 2025-07-29

## 2025-07-29 PROCEDURE — 3074F SYST BP LT 130 MM HG: CPT | Performed by: INTERNAL MEDICINE

## 2025-07-29 PROCEDURE — 3008F BODY MASS INDEX DOCD: CPT | Performed by: INTERNAL MEDICINE

## 2025-07-29 PROCEDURE — 99214 OFFICE O/P EST MOD 30 MIN: CPT | Performed by: INTERNAL MEDICINE

## 2025-07-29 PROCEDURE — 3078F DIAST BP <80 MM HG: CPT | Performed by: INTERNAL MEDICINE

## 2025-07-29 RX ORDER — OXYCODONE AND ACETAMINOPHEN 10; 325 MG/1; MG/1
0.5 TABLET ORAL EVERY 6 HOURS PRN
Qty: 60 TABLET | Refills: 0 | Status: SHIPPED | OUTPATIENT
Start: 2025-07-29

## 2025-07-29 RX ORDER — LISINOPRIL 10 MG/1
10 TABLET ORAL DAILY
Qty: 100 TABLET | Refills: 3 | Status: SHIPPED | OUTPATIENT
Start: 2025-07-29 | End: 2026-09-02

## 2025-07-29 ASSESSMENT — PATIENT HEALTH QUESTIONNAIRE - PHQ9
2. FEELING DOWN, DEPRESSED OR HOPELESS: NOT AT ALL
1. LITTLE INTEREST OR PLEASURE IN DOING THINGS: NOT AT ALL
SUM OF ALL RESPONSES TO PHQ9 QUESTIONS 1 AND 2: 0

## 2025-07-29 ASSESSMENT — ENCOUNTER SYMPTOMS
ROS SKIN COMMENTS: INSECT STING
MYALGIAS: 1

## 2025-07-29 ASSESSMENT — PAIN SCALES - GENERAL: PAINLEVEL_OUTOF10: 0-NO PAIN

## 2025-07-29 NOTE — PROGRESS NOTES
Subjective   Patient ID: Saurabh Patel is a 65 y.o. adult who presents for acute concern.    HPI     Patient presents today with concern of right ankle pain after an insect sting.   She states she got stung on Saturday and her ankle remains painful and tender to touch.   She says it is improving     Also concern of pain on the exterior of her left ear and the external auditory canal seemed swollen and painful to touch since Sunday. Advised likely just irritation. Recommend ice to the ear PRN.    Has chronic B/L foot pain. Neck pain has improved some.   She is still using oxycodone as needed, will ususally take one half of a 10/325 tab once or sometimes twice a day at the end of the day    GERD has improved, no longer taking pantoprazole    She is now taking lisinopril 10 mg      Latest Reference Range & Units 04/07/25 10:00 07/24/25 10:15   GLUCOSE 65 - 99 mg/dL 107 (H)    SODIUM 135 - 146 mmol/L 136    POTASSIUM 3.5 - 5.3 mmol/L 5.0    CHLORIDE 98 - 110 mmol/L 101    CARBON DIOXIDE 20 - 32 mmol/L 25    ELECTROLYTE BALANCE 7 - 17 mmol/L (calc) 10    UREA NITROGEN (BUN) 7 - 25 mg/dL 15    CREATININE 0.50 - 1.05 mg/dL 1.01    EGFR > OR = 60 mL/min/1.73m2 62    CALCIUM 8.6 - 10.4 mg/dL 9.2    ALBUMIN 3.6 - 5.1 g/dL 4.3    PROTEIN, TOTAL 6.1 - 8.1 g/dL 6.9    ALKALINE PHOSPHATASE 37 - 153 U/L 55    ALT 6 - 29 U/L 10    AST 10 - 35 U/L 14    BILIRUBIN, TOTAL 0.2 - 1.2 mg/dL 0.6    CHOLESTEROL, TOTAL <200 mg/dL 188    HDL CHOLESTEROL > OR = 50 mg/dL 82    CHOL/HDLC RATIO <5.0 (calc) 2.3    LDL-CHOLESTEROL mg/dL (calc) 89    TRIGLYCERIDES <150 mg/dL 78    NON HDL CHOLESTEROL <130 mg/dL (calc) 106    LIPOPROTEIN (a) <75 nmol/L 18    HS CRP mg/L  1.6    DIRECT LDL <100 mg/dL 96    HEMOGLOBIN A1c <5.7 % 5.9 (H) 5.7 (H)   eAG (mg/dL) mg/dL 123 117   eAG (mmol/L) mmol/L 6.8 6.5   (H): Data is abnormally high    Review of Systems   HENT:  Positive for ear pain.    Musculoskeletal:  Positive for myalgias.   Skin:          "Insect sting        Current Medications[1]    Objective   /74   Pulse 62   Resp 16   Ht 1.651 m (5' 5\")   Wt 63 kg (139 lb)   BMI 23.13 kg/m²     Physical Exam  Constitutional:       Appearance: Normal appearance.   HENT:      Right Ear: Ear canal and external ear normal.      Ears:      Comments: Appearance of minimal swelling of left external ear and external canal. No oozing, no erythema .      Mouth/Throat:      Mouth: Mucous membranes are moist.      Pharynx: Oropharynx is clear.     Eyes:      Conjunctiva/sclera: Conjunctivae normal.      Pupils: Pupils are equal, round, and reactive to light.       Cardiovascular:      Rate and Rhythm: Normal rate and regular rhythm.      Heart sounds: Normal heart sounds.   Pulmonary:      Effort: Pulmonary effort is normal.      Breath sounds: Normal breath sounds.   Abdominal:      General: Bowel sounds are normal.      Palpations: Abdomen is soft.   Lymphadenopathy:      Cervical: No cervical adenopathy.     Skin:     General: Skin is warm and dry.      Comments: She has 3 small excoriated areas on her right ankle, over the lateral malleolus. NO cellulitis.      Neurological:      General: No focal deficit present.         Assessment/Plan   Problem List Items Addressed This Visit       Cervical spondylosis without myelopathy    Chronic pain.   Working with PT and massage therapy.   Refill sent for oxycodone-acetaminophen  mg 0.5 tab every 6 hours as needed # 60 with 0     I have personally reviewed the OARRS report for this patient. I have considered the risks of abuse, dependence, addiction and diversion. I believe that it is clinically appropriate for this patient to be prescribed this medication.           Relevant Medications    oxyCODONE-acetaminophen (Percocet)  mg tablet    Insect sting - Primary    Concern of an insect sting on her right ankle that occurred on Saturday.  Experiencing residual pain and swelling.   No signs or concerns of " infection on external exam.   Discussed that it may take time for the area to heal completely.          Benign essential HTN    Remain on lisinopril 10 mg daily   Refill sent today          Relevant Medications    lisinopril 10 mg tablet    Other Relevant Orders    Comprehensive Metabolic Panel    Lipid Panel    CBC    Elevated lipids    Will continue to monitor.   Labs ordered today.            Relevant Orders    Comprehensive Metabolic Panel    Lipid Panel    External ear pain left ear: on exam it seems like there was some swelling of the external ear. No eczema. She feels it is resolving.   Elevated glucose    A1c 5.7  Will continue to monitor  Labs ordered today.            Screening for prostate cancer    Will continue to monitor.   Labs ordered today.            Relevant Orders    PSA     Other Visit Diagnoses         Visit for screening mammogram        Relevant Orders    BI mammo bilateral screening tomosynthesis      Medication monitoring encounter        Relevant Orders    XR DEXA bone density              Please return to see me at next scheduled appointment in October.     Scribe Attestation  By signing my name below, IGloria Scribe   attest that this documentation has been prepared under the direction and in the presence of Lauren Cedeño DO.         [1]   Current Outpatient Medications:     albuterol 90 mcg/actuation inhaler, One or two puffs every 4 hours as needed for wheezing or shortness of breath., Disp: 18 g, Rfl: 1    alpha lipoic acid 300 mg capsule, Take by mouth., Disp: , Rfl:     b complex vitamins capsule, Take by mouth., Disp: , Rfl:     estradiol (Estrace) 0.01 % (0.1 mg/gram) vaginal cream, APPLY A PEA-SIZED AMOUNT   VAGINALLY 3 TIMES A WEEK ATBEDTIME, Disp: 42.5 g, Rfl: 0    estradiol (Vivelle-DOT) 0.1 mg/24 hr patch, Place 1 patch on the skin 2 times a week., Disp: 24 patch, Rfl: 3    lisinopril 10 mg tablet, Take 1 tablet (10 mg) by mouth once daily., Disp: 100 tablet, Rfl:  3    multivitamin tablet, Take 1 tablet by mouth once daily., Disp: , Rfl:     oxyCODONE-acetaminophen (Percocet)  mg tablet, Take 0.5 tablets by mouth every 6 hours if needed for severe pain (7 - 10)., Disp: 60 tablet, Rfl: 0    pantoprazole (ProtoNix) 40 mg EC tablet, Take 1 tablet (40 mg) by mouth once daily in the morning. Take before meals., Disp: 90 tablet, Rfl: 3    phenazopyridine (Pyridium) 100 mg tablet, Take 1 tablet (100mg) by mouth three times daily after meals as needed for urinary burning. Will turn urine orange/red in color, Disp: 9 tablet, Rfl: 0    phenazopyridine (Pyridium) 100 mg tablet, Take 1 tablet (100 mg) by mouth 3 times a day as needed for bladder spasms for up to 30 doses., Disp: 30 tablet, Rfl: 0    progesterone (Prometrium) 100 mg capsule, TAKE 1 CAPSULE (100 MG) BY MOUTH ONCE DAILY AT BEDTIME., Disp: 90 capsule, Rfl: 1    testosterone 20.25 mg/1.25 gram (1.62 %) gel in metered-dose pump, Apply one pump once weekly, Disp: 75 g, Rfl: 0    timolol (Timoptic) 0.5 % ophthalmic solution, Administer 1 drop into both eyes once daily at bedtime., Disp: , Rfl:

## 2025-07-29 NOTE — ASSESSMENT & PLAN NOTE
Chronic pain.   Working with PT and massage therapy.   Refill sent for oxycodone-acetaminophen  mg 0.5 tab every 6 hours as needed # 60 with 0     I have personally reviewed the OARRS report for this patient. I have considered the risks of abuse, dependence, addiction and diversion. I believe that it is clinically appropriate for this patient to be prescribed this medication.

## 2025-07-29 NOTE — ASSESSMENT & PLAN NOTE
Concern of an insect sting on her right ankle that occurred on Saturday.  Experiencing residual pain and swelling.   No signs or concerns of infection on external exam.   Discussed that it may take time for the area to heal completely.

## 2025-07-30 LAB
ESTROGEN SERPL-MCNC: 287 PG/ML
TESTOST SERPL-MCNC: 18 NG/DL (ref 2–45)

## 2025-08-21 ENCOUNTER — HOSPITAL ENCOUNTER (OUTPATIENT)
Dept: RADIOLOGY | Facility: CLINIC | Age: 65
Discharge: HOME | End: 2025-08-21
Payer: COMMERCIAL

## 2025-08-21 DIAGNOSIS — I10 BENIGN ESSENTIAL HTN: ICD-10-CM

## 2025-08-21 PROCEDURE — 75571 CT HRT W/O DYE W/CA TEST: CPT

## 2025-09-16 ENCOUNTER — APPOINTMENT (OUTPATIENT)
Dept: ORTHOPEDIC SURGERY | Facility: CLINIC | Age: 65
End: 2025-09-16
Payer: COMMERCIAL

## 2025-09-23 ENCOUNTER — APPOINTMENT (OUTPATIENT)
Dept: ORTHOPEDIC SURGERY | Facility: CLINIC | Age: 65
End: 2025-09-23
Payer: COMMERCIAL

## 2025-09-30 ENCOUNTER — APPOINTMENT (OUTPATIENT)
Dept: SURGERY | Facility: CLINIC | Age: 65
End: 2025-09-30
Payer: COMMERCIAL

## 2025-10-15 ENCOUNTER — APPOINTMENT (OUTPATIENT)
Dept: PRIMARY CARE | Facility: CLINIC | Age: 65
End: 2025-10-15
Payer: COMMERCIAL

## 2025-10-16 ENCOUNTER — APPOINTMENT (OUTPATIENT)
Dept: ALLERGY | Facility: CLINIC | Age: 65
End: 2025-10-16
Payer: COMMERCIAL